# Patient Record
Sex: FEMALE | Race: WHITE | NOT HISPANIC OR LATINO | Employment: OTHER | URBAN - METROPOLITAN AREA
[De-identification: names, ages, dates, MRNs, and addresses within clinical notes are randomized per-mention and may not be internally consistent; named-entity substitution may affect disease eponyms.]

---

## 2023-05-23 ENCOUNTER — TELEPHONE (OUTPATIENT)
Dept: PRIMARY CARE | Facility: CLINIC | Age: 73
End: 2023-05-23
Payer: MEDICARE

## 2023-05-23 DIAGNOSIS — Z12.31 ENCOUNTER FOR SCREENING MAMMOGRAM FOR MALIGNANT NEOPLASM OF BREAST: Primary | ICD-10-CM

## 2023-06-15 ENCOUNTER — OFFICE VISIT (OUTPATIENT)
Dept: PRIMARY CARE | Facility: CLINIC | Age: 73
End: 2023-06-15
Payer: MEDICARE

## 2023-06-15 DIAGNOSIS — Z00.00 ROUTINE GENERAL MEDICAL EXAMINATION AT HEALTH CARE FACILITY: ICD-10-CM

## 2023-06-15 DIAGNOSIS — R53.83 MALAISE AND FATIGUE: ICD-10-CM

## 2023-06-15 DIAGNOSIS — R73.9 HYPERGLYCEMIA: ICD-10-CM

## 2023-06-15 DIAGNOSIS — E78.5 HYPERLIPIDEMIA, UNSPECIFIED HYPERLIPIDEMIA TYPE: ICD-10-CM

## 2023-06-15 DIAGNOSIS — M25.562 LEFT KNEE PAIN, UNSPECIFIED CHRONICITY: Primary | ICD-10-CM

## 2023-06-15 DIAGNOSIS — R53.81 MALAISE AND FATIGUE: ICD-10-CM

## 2023-06-15 DIAGNOSIS — R10.9 ABDOMINAL PAIN, UNSPECIFIED ABDOMINAL LOCATION: ICD-10-CM

## 2023-06-15 DIAGNOSIS — R07.9 CHEST PAIN, UNSPECIFIED TYPE: ICD-10-CM

## 2023-06-15 PROCEDURE — 1036F TOBACCO NON-USER: CPT | Performed by: INTERNAL MEDICINE

## 2023-06-15 PROCEDURE — 83036 HEMOGLOBIN GLYCOSYLATED A1C: CPT | Performed by: INTERNAL MEDICINE

## 2023-06-15 PROCEDURE — 1170F FXNL STATUS ASSESSED: CPT | Performed by: INTERNAL MEDICINE

## 2023-06-15 PROCEDURE — 80061 LIPID PANEL: CPT | Performed by: INTERNAL MEDICINE

## 2023-06-15 PROCEDURE — 84443 ASSAY THYROID STIM HORMONE: CPT | Performed by: INTERNAL MEDICINE

## 2023-06-15 PROCEDURE — 1159F MED LIST DOCD IN RCRD: CPT | Performed by: INTERNAL MEDICINE

## 2023-06-15 PROCEDURE — 80053 COMPREHEN METABOLIC PANEL: CPT | Performed by: INTERNAL MEDICINE

## 2023-06-15 PROCEDURE — 1160F RVW MEDS BY RX/DR IN RCRD: CPT | Performed by: INTERNAL MEDICINE

## 2023-06-15 PROCEDURE — 99213 OFFICE O/P EST LOW 20 MIN: CPT | Performed by: INTERNAL MEDICINE

## 2023-06-15 PROCEDURE — 85025 COMPLETE CBC W/AUTO DIFF WBC: CPT | Performed by: INTERNAL MEDICINE

## 2023-06-15 PROCEDURE — G0439 PPPS, SUBSEQ VISIT: HCPCS | Performed by: INTERNAL MEDICINE

## 2023-06-15 RX ORDER — IVERMECTIN 10 MG/G
CREAM TOPICAL
COMMUNITY

## 2023-06-15 RX ORDER — FLUTICASONE PROPIONATE 50 MCG
1 SPRAY, SUSPENSION (ML) NASAL DAILY
COMMUNITY
Start: 2022-11-25 | End: 2024-03-06 | Stop reason: WASHOUT

## 2023-06-15 RX ORDER — ACETAMINOPHEN 500 MG
1 TABLET ORAL DAILY
COMMUNITY

## 2023-06-15 RX ORDER — MULTIVIT WITH MINERALS/HERBS
1 TABLET ORAL DAILY
COMMUNITY

## 2023-06-15 RX ORDER — SCOLOPAMINE TRANSDERMAL SYSTEM 1 MG/1
1 PATCH, EXTENDED RELEASE TRANSDERMAL
COMMUNITY
Start: 2023-01-09 | End: 2024-03-06 | Stop reason: WASHOUT

## 2023-06-15 RX ORDER — ACETAMINOPHEN 500 MG
TABLET ORAL 2 TIMES DAILY
COMMUNITY

## 2023-06-15 RX ORDER — ROSUVASTATIN CALCIUM 20 MG/1
1 TABLET, COATED ORAL DAILY
COMMUNITY
Start: 2016-09-07 | End: 2023-11-06

## 2023-06-15 ASSESSMENT — ACTIVITIES OF DAILY LIVING (ADL)
USING TELEPHONE: INDEPENDENT
USING TRANSPORTATION: INDEPENDENT
TAKING MEDICATION: INDEPENDENT
DOING HOUSEWORK: INDEPENDENT
EATING: INDEPENDENT
ADEQUATE_TO_COMPLETE_ADL: YES
BATHING: INDEPENDENT
NEEDS ASSISTANCE WITH FOOD: INDEPENDENT
GROCERY SHOPPING: INDEPENDENT
HEARING - RIGHT EAR: HEARING AID
STIL DRIVING: YES
WALKS IN HOME: INDEPENDENT
FEEDING YOURSELF: INDEPENDENT
MANAGING FINANCES: INDEPENDENT
GROOMING: INDEPENDENT
PATIENT'S MEMORY ADEQUATE TO SAFELY COMPLETE DAILY ACTIVITIES?: YES
PREPARING MEALS: INDEPENDENT
DRESSING YOURSELF: INDEPENDENT
TOILETING: INDEPENDENT
HEARING - LEFT EAR: HEARING AID
JUDGMENT_ADEQUATE_SAFELY_COMPLETE_DAILY_ACTIVITIES: YES

## 2023-06-15 ASSESSMENT — ANXIETY QUESTIONNAIRES
7. FEELING AFRAID AS IF SOMETHING AWFUL MIGHT HAPPEN: NOT AT ALL
2. NOT BEING ABLE TO STOP OR CONTROL WORRYING: NOT AT ALL
6. BECOMING EASILY ANNOYED OR IRRITABLE: NOT AT ALL
3. WORRYING TOO MUCH ABOUT DIFFERENT THINGS: NOT AT ALL
GAD7 TOTAL SCORE: 0
1. FEELING NERVOUS, ANXIOUS, OR ON EDGE: NOT AT ALL
IF YOU CHECKED OFF ANY PROBLEMS ON THIS QUESTIONNAIRE, HOW DIFFICULT HAVE THESE PROBLEMS MADE IT FOR YOU TO DO YOUR WORK, TAKE CARE OF THINGS AT HOME, OR GET ALONG WITH OTHER PEOPLE: NOT DIFFICULT AT ALL
4. TROUBLE RELAXING: NOT AT ALL
5. BEING SO RESTLESS THAT IT IS HARD TO SIT STILL: NOT AT ALL

## 2023-06-15 ASSESSMENT — ENCOUNTER SYMPTOMS
OCCASIONAL FEELINGS OF UNSTEADINESS: 0
DEPRESSION: 0
LOSS OF SENSATION IN FEET: 0

## 2023-06-15 ASSESSMENT — PATIENT HEALTH QUESTIONNAIRE - PHQ9
SUM OF ALL RESPONSES TO PHQ9 QUESTIONS 1 AND 2: 0
SUM OF ALL RESPONSES TO PHQ9 QUESTIONS 1 AND 2: 0
1. LITTLE INTEREST OR PLEASURE IN DOING THINGS: NOT AT ALL
2. FEELING DOWN, DEPRESSED OR HOPELESS: NOT AT ALL
1. LITTLE INTEREST OR PLEASURE IN DOING THINGS: NOT AT ALL
2. FEELING DOWN, DEPRESSED OR HOPELESS: NOT AT ALL

## 2023-06-15 ASSESSMENT — COLUMBIA-SUICIDE SEVERITY RATING SCALE - C-SSRS
1. IN THE PAST MONTH, HAVE YOU WISHED YOU WERE DEAD OR WISHED YOU COULD GO TO SLEEP AND NOT WAKE UP?: NO
6. HAVE YOU EVER DONE ANYTHING, STARTED TO DO ANYTHING, OR PREPARED TO DO ANYTHING TO END YOUR LIFE?: NO
2. HAVE YOU ACTUALLY HAD ANY THOUGHTS OF KILLING YOURSELF?: NO
6. HAVE YOU EVER DONE ANYTHING, STARTED TO DO ANYTHING, OR PREPARED TO DO ANYTHING TO END YOUR LIFE?: NO
1. IN THE PAST MONTH, HAVE YOU WISHED YOU WERE DEAD OR WISHED YOU COULD GO TO SLEEP AND NOT WAKE UP?: NO
2. HAVE YOU ACTUALLY HAD ANY THOUGHTS OF KILLING YOURSELF?: NO

## 2023-06-15 ASSESSMENT — PAIN SCALES - GENERAL: PAINLEVEL: 2

## 2023-06-15 ASSESSMENT — GERIATRIC MINI NUTRITIONAL ASSESSMENT (MNA): C GENERAL MOBILITY: GOES OUT

## 2023-06-15 NOTE — PROGRESS NOTES
Subjective    Montse Manzo is a 73 y.o. female who presents for  Medicare Wellness Exam.  Patient is here for Blood work.    HPI    This is a 73 years old lady with medical history of hyperlipidemia., Degenerative joint disease, history of ovarian cyst removal, history of asthma, rosacea, S/p L shoulder Rotator surgery 12/12/2019, s/p Acute Bronchitis 8/2022.  Patient is  presented for Medicare Wellness Annual exam.  Interested to have Blood work done.    Patient is seen by GYN, has had mammography done  6/2/2023  Keeps a strict diet.  Cologuard 1/4/2021 is negative.  Followed by ophthalmology.     Has been having insomnia.     Review of Systems   Constitutional:  Negative for activity change, appetite change, fatigue and unexpected weight change.   Respiratory:  Negative for apnea and chest tightness.    Gastrointestinal:  Negative for abdominal distention.   Genitourinary:  Negative for dysuria.   Musculoskeletal:  Positive for arthralgias. Negative for back pain.   Neurological:  Negative for light-headedness and headaches.       Objective        Vitals:    06/15/23 1117   BP: 128/78   Pulse: 62   Resp: 16   Temp: 36.4 °C (97.5 °F)        Physical Exam  Constitutional:       Appearance: Normal appearance.   HENT:      Head: Normocephalic and atraumatic.   Eyes:      Pupils: Pupils are equal, round, and reactive to light.   Cardiovascular:      Rate and Rhythm: Normal rate and regular rhythm.   Pulmonary:      Breath sounds: Normal breath sounds.   Abdominal:      Palpations: Abdomen is soft.   Musculoskeletal:         General: Normal range of motion.      Cervical back: Normal range of motion.   Skin:     General: Skin is warm.   Neurological:      General: No focal deficit present.      Mental Status: She is alert.       Diagnoses and all orders for this visit:  Left knee pain, unspecified chronicity (Primary)  -     XR knee left 3 views; Future  -     XR knee left 3 views  Chest pain, unspecified type  -      XR chest 2 views; Future  -     XR chest 2 views  Abdominal pain, unspecified abdominal location  -     Cancel: US abdomen complete; Future  -     Cancel: US abdomen complete  Malaise and fatigue  -     CBC  -     Thyroid Stimulating Hormone  Hyperlipidemia, unspecified hyperlipidemia type  -     Comprehensive Metabolic Panel  -     Lipid Panel  Hyperglycemia  -     Hemoglobin A1C  Other orders  -     US abdomen complete       - H of L shoulder arthroscopic surgery 12/12/2019 for massive rotator cuff tear.  Stable.     - Health maintenance.  Medicare WEllness Annual exam is  today.  Influenza is up to date.  Cologuard is negative 1/2021.  Shingrix 6/2020 and 9/2020  Mammography is  6/2/2023  Pneumonia vaccination is up to date.  GYN exam is up to date.     - Rosacea.  Follow-up with dermatology Dr Szymanski.  Current medical therapy.     - Overweight with BMI. 29.05.  Diet, exercise.  Ideal BMI is less, then 25.     - Hyperlipidemia.  Continue to take Crestor.     - Insomnia.  Using over-the-counter supplements.  Consider melatonin at bedtime.  Sleep hygiene.     Bina Hinds MD

## 2023-06-16 VITALS
TEMPERATURE: 97.5 F | HEIGHT: 63 IN | SYSTOLIC BLOOD PRESSURE: 128 MMHG | BODY MASS INDEX: 29.06 KG/M2 | DIASTOLIC BLOOD PRESSURE: 78 MMHG | HEART RATE: 62 BPM | RESPIRATION RATE: 16 BRPM | WEIGHT: 164 LBS

## 2023-06-16 ASSESSMENT — ENCOUNTER SYMPTOMS
BACK PAIN: 0
ABDOMINAL DISTENTION: 0
CHEST TIGHTNESS: 0
ARTHRALGIAS: 1
APNEA: 0
HEADACHES: 0
FATIGUE: 0
APPETITE CHANGE: 0
ACTIVITY CHANGE: 0
LIGHT-HEADEDNESS: 0
UNEXPECTED WEIGHT CHANGE: 0
DYSURIA: 0

## 2023-06-17 NOTE — PROGRESS NOTES
"Subjective   Reason for Visit: Montse Manzo is an 73 y.o. female here for a Medicare Wellness visit.          Reviewed all medications by prescribing practitioner or clinical pharmacist (such as prescriptions, OTCs, herbal therapies and supplements) and documented in the medical record.    HPI    Patient Care Team:  Bina Hinds MD as PCP - General  Bina Hinds MD as PCP - [employer] PCP     Review of Systems    Objective   Vitals:  /78   Pulse 62   Temp 36.4 °C (97.5 °F) (Temporal)   Resp 16   Ht 1.6 m (5' 3\")   Wt 74.4 kg (164 lb)   BMI 29.05 kg/m²       Physical Exam    Assessment/Plan   Problem List Items Addressed This Visit    None  Visit Diagnoses     Left knee pain, unspecified chronicity    -  Primary    Relevant Orders    XR knee left 3 views (Completed)    Chest pain, unspecified type        Relevant Orders    XR chest 2 views (Completed)    Abdominal pain, unspecified abdominal location        Malaise and fatigue        Relevant Orders    CBC    Thyroid Stimulating Hormone    Hyperlipidemia, unspecified hyperlipidemia type        Relevant Orders    Comprehensive Metabolic Panel    Lipid Panel    Hyperglycemia        Relevant Orders    Hemoglobin A1C    Routine general medical examination at health care facility        Relevant Orders    1 Year Follow Up In Primary Care - Wellness Exam             "

## 2023-06-22 ENCOUNTER — APPOINTMENT (OUTPATIENT)
Dept: PRIMARY CARE | Facility: CLINIC | Age: 73
End: 2023-06-22
Payer: MEDICARE

## 2023-06-22 DIAGNOSIS — R39.15 URGENCY OF URINATION: Primary | ICD-10-CM

## 2023-10-28 ENCOUNTER — HOSPITAL ENCOUNTER (EMERGENCY)
Facility: HOSPITAL | Age: 73
Discharge: HOME | End: 2023-10-28
Attending: FAMILY MEDICINE
Payer: MEDICARE

## 2023-10-28 VITALS
SYSTOLIC BLOOD PRESSURE: 115 MMHG | HEART RATE: 63 BPM | OXYGEN SATURATION: 97 % | DIASTOLIC BLOOD PRESSURE: 61 MMHG | BODY MASS INDEX: 30.08 KG/M2 | WEIGHT: 169.75 LBS | HEIGHT: 63 IN | TEMPERATURE: 98.1 F | RESPIRATION RATE: 16 BRPM

## 2023-10-28 DIAGNOSIS — S90.112A CONTUSION OF LEFT GREAT TOE WITHOUT DAMAGE TO NAIL, INITIAL ENCOUNTER: Primary | ICD-10-CM

## 2023-10-28 DIAGNOSIS — M79.605 PAIN OF LEFT LOWER EXTREMITY: ICD-10-CM

## 2023-10-28 PROCEDURE — 99283 EMERGENCY DEPT VISIT LOW MDM: CPT | Mod: 25 | Performed by: FAMILY MEDICINE

## 2023-10-28 ASSESSMENT — PAIN - FUNCTIONAL ASSESSMENT: PAIN_FUNCTIONAL_ASSESSMENT: 0-10

## 2023-10-28 ASSESSMENT — PAIN SCALES - GENERAL
PAINLEVEL_OUTOF10: 5 - MODERATE PAIN
PAINLEVEL_OUTOF10: 0 - NO PAIN

## 2023-10-28 ASSESSMENT — COLUMBIA-SUICIDE SEVERITY RATING SCALE - C-SSRS
2. HAVE YOU ACTUALLY HAD ANY THOUGHTS OF KILLING YOURSELF?: NO
1. IN THE PAST MONTH, HAVE YOU WISHED YOU WERE DEAD OR WISHED YOU COULD GO TO SLEEP AND NOT WAKE UP?: NO
6. HAVE YOU EVER DONE ANYTHING, STARTED TO DO ANYTHING, OR PREPARED TO DO ANYTHING TO END YOUR LIFE?: NO

## 2023-10-28 ASSESSMENT — PAIN DESCRIPTION - LOCATION: LOCATION: FOOT

## 2023-10-28 ASSESSMENT — PAIN DESCRIPTION - ORIENTATION: ORIENTATION: LEFT

## 2023-10-28 NOTE — ED PROVIDER NOTES
HPI   Chief Complaint   Patient presents with    Foot Injury     Female patient states an eliptical was dropped in her left foot yesteday. She denies having any numbness or tingling. Pain is worse with ambulation.        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  She was moving a elliptical machine yesterday when her partner on the other and dropped it.  Her and landed on her left great toe.  She immediately applied pressure and ice.  She presents today concerned of possible fracture.  She has been ambulating, although when she puts her shoe on she can feel a lot of pressure over the great toe.                           No data recorded                Patient History   Past Medical History:   Diagnosis Date    Chronic rhinitis     Chronic rhinitis    Dyslipidemia     Other conditions influencing health status 03/09/2020    History of cough     Past Surgical History:   Procedure Laterality Date    OTHER SURGICAL HISTORY  12/23/2019    Rotator cuff repair    ROTATOR CUFF REPAIR       No family history on file.  Social History     Tobacco Use    Smoking status: Former     Types: Cigarettes     Passive exposure: Past    Smokeless tobacco: Never   Vaping Use    Vaping Use: Never used   Substance Use Topics    Alcohol use: Yes    Drug use: Never       Physical Exam   ED Triage Vitals [10/28/23 1435]   Temp Heart Rate Resp BP   36.7 °C (98.1 °F) 63 16 115/61      SpO2 Temp Source Heart Rate Source Patient Position   97 % Temporal Monitor Sitting      BP Location FiO2 (%)     Left arm --       Physical Exam  Musculoskeletal:      Comments: Left great toe: There is subungual hematoma under the nail, which is intact.  The first, second and third toes are ecchymotic, and tender to palpation across the distal phalanges.          ED Course & MDM   Diagnoses as of 10/29/23 8847   Contusion of left great toe without damage to nail, initial encounter   Pain of left lower extremity       Medical Decision Making  X-ray of the patient's  left foot was nonacute.  There was mention of ossific fragments about the fibular sesamoid bone at the 1st MTP  joint that were read as remote/nonacute.     The patient is advised to apply ice to the distal toes over the next 48 hr, followed by heat. She declines a walking boot or prosthetic.   She is also referred to podiatry for evaluation of the unusual, nonacute ossific fragments noted in the xray.           Procedure  Procedures     Leonel Carranza MD  10/28/23 1558       Leonel Carranza MD  10/29/23 5822

## 2023-11-06 DIAGNOSIS — E78.5 HYPERLIPIDEMIA, UNSPECIFIED HYPERLIPIDEMIA TYPE: Primary | ICD-10-CM

## 2023-11-06 RX ORDER — ROSUVASTATIN CALCIUM 20 MG/1
20 TABLET, COATED ORAL DAILY
Qty: 90 TABLET | Refills: 0 | Status: SHIPPED | OUTPATIENT
Start: 2023-11-06 | End: 2023-11-07 | Stop reason: SDUPTHER

## 2023-11-07 RX ORDER — ROSUVASTATIN CALCIUM 20 MG/1
20 TABLET, COATED ORAL DAILY
Qty: 90 TABLET | Refills: 2 | Status: SHIPPED | OUTPATIENT
Start: 2023-11-07 | End: 2023-11-20 | Stop reason: SDUPTHER

## 2024-03-06 ENCOUNTER — OFFICE VISIT (OUTPATIENT)
Dept: OBSTETRICS AND GYNECOLOGY | Facility: CLINIC | Age: 74
End: 2024-03-06
Payer: MEDICARE

## 2024-03-06 VITALS — HEIGHT: 63 IN | WEIGHT: 175 LBS | BODY MASS INDEX: 31.01 KG/M2

## 2024-03-06 DIAGNOSIS — Z12.31 BREAST CANCER SCREENING BY MAMMOGRAM: ICD-10-CM

## 2024-03-06 DIAGNOSIS — Z01.419 WELL WOMAN EXAM: Primary | ICD-10-CM

## 2024-03-06 DIAGNOSIS — Z12.4 CERVICAL CANCER SCREENING: ICD-10-CM

## 2024-03-06 PROCEDURE — 88175 CYTOPATH C/V AUTO FLUID REDO: CPT

## 2024-03-06 PROCEDURE — 99387 INIT PM E/M NEW PAT 65+ YRS: CPT | Performed by: OBSTETRICS & GYNECOLOGY

## 2024-03-06 PROCEDURE — 87624 HPV HI-RISK TYP POOLED RSLT: CPT

## 2024-03-06 PROCEDURE — 1126F AMNT PAIN NOTED NONE PRSNT: CPT | Performed by: OBSTETRICS & GYNECOLOGY

## 2024-03-06 PROCEDURE — 1157F ADVNC CARE PLAN IN RCRD: CPT | Performed by: OBSTETRICS & GYNECOLOGY

## 2024-03-06 PROCEDURE — 1036F TOBACCO NON-USER: CPT | Performed by: OBSTETRICS & GYNECOLOGY

## 2024-03-06 ASSESSMENT — ENCOUNTER SYMPTOMS
FATIGUE: 0
HEMATURIA: 0
BLOOD IN STOOL: 0
COLOR CHANGE: 0
ABDOMINAL PAIN: 0
FLANK PAIN: 0
CONSTIPATION: 0
FEVER: 0
VOMITING: 0
SLEEP DISTURBANCE: 0
SHORTNESS OF BREATH: 0
DIARRHEA: 0
APPETITE CHANGE: 0
ABDOMINAL DISTENTION: 0
DYSURIA: 0
NAUSEA: 0
CHILLS: 0
UNEXPECTED WEIGHT CHANGE: 0
FREQUENCY: 0
BACK PAIN: 0

## 2024-03-06 ASSESSMENT — PAIN SCALES - GENERAL: PAINLEVEL: 0-NO PAIN

## 2024-03-06 NOTE — PROGRESS NOTES
History Of Present Illness  Routine Gyn Exam  Montse CARRION Anais here for routine WWE.  Pt is menopausal.  LMP .  Did take some short courses of progesterone at the time but no other HRT.     Reviewed pertinent medical, surgical, social and family history with patient.  Concerns: none.   Exercise: walking.    Gynecologic History  Menopause: LMP 2009 ; cont to experience some mild hot flashes  HRT use: never  Pap history: denies h/o abnormal pap   Sexually active: yes with female partner   GYN concerns in past: h/o right ovarian cystectomy     Obstetric History  OB History    Para Term  AB Living   0 0 0 0 0 0   SAB IAB Ectopic Multiple Live Births   0 0 0 0 0        Past Medical History  She has a past medical history of Chronic rhinitis, Dyslipidemia, and Other conditions influencing health status (2020).    Surgical History  She has a past surgical history that includes Other surgical history (2019) and Rotator cuff repair.     Social History  She reports that she has quit smoking. Her smoking use included cigarettes. She has been exposed to tobacco smoke. She has never used smokeless tobacco. She reports current alcohol use. She reports that she does not use drugs.    Family History  No family history on file.     Allergies  Atorvastatin, Clarithromycin, and Bacitracin    Review of Systems   Constitutional:  Negative for appetite change, chills, fatigue, fever and unexpected weight change.   Respiratory:  Negative for shortness of breath.    Cardiovascular:  Negative for chest pain.   Gastrointestinal:  Negative for abdominal distention, abdominal pain, blood in stool, constipation, diarrhea, nausea and vomiting.   Endocrine: Negative for cold intolerance and heat intolerance.   Genitourinary:  Negative for dyspareunia, dysuria, flank pain, frequency, genital sores, hematuria, menstrual problem, pelvic pain, urgency, vaginal bleeding, vaginal discharge and vaginal pain.  "  Musculoskeletal:  Negative for back pain.   Skin:  Negative for color change.   Psychiatric/Behavioral:  Negative for sleep disturbance.        Ht 1.6 m (5' 3\")   Wt 79.4 kg (175 lb)   BMI 31.00 kg/m²      Physical Exam  Constitutional:       Appearance: Normal appearance.   HENT:      Head: Normocephalic and atraumatic.   Chest:   Breasts:     Right: Normal.      Left: Normal.   Abdominal:      General: Abdomen is flat.      Palpations: Abdomen is soft.      Tenderness: There is no abdominal tenderness.   Genitourinary:     General: Normal vulva.      Vagina: Normal.      Cervix: Normal.      Uterus: Normal.       Adnexa: Right adnexa normal and left adnexa normal.   Skin:     General: Skin is warm and dry.   Neurological:      Mental Status: She is alert and oriented to person, place, and time.   Psychiatric:         Mood and Affect: Mood normal.               Assessment/Plan         Routine Well Woman Exam Today  Discussed diet and exercise.   Reviewed routine health screenings.   Pap: pap done today   Recommend annual mammograms.   Currently up to date on colon cancer screening.  (Cologuard 2021)                   Jeniffer Marti MD  "

## 2024-03-19 LAB
CYTOLOGY CMNT CVX/VAG CYTO-IMP: NORMAL
HPV HR 12 DNA GENITAL QL NAA+PROBE: NEGATIVE
HPV HR GENOTYPES PNL CVX NAA+PROBE: NEGATIVE
HPV16 DNA SPEC QL NAA+PROBE: NEGATIVE
HPV18 DNA SPEC QL NAA+PROBE: NEGATIVE
LAB AP HPV GENOTYPE QUESTION: YES
LAB AP HPV HR: NORMAL
LABORATORY COMMENT REPORT: NORMAL
PATH REPORT.TOTAL CANCER: NORMAL

## 2024-04-03 ENCOUNTER — OFFICE VISIT (OUTPATIENT)
Dept: PRIMARY CARE | Facility: CLINIC | Age: 74
End: 2024-04-03
Payer: MEDICARE

## 2024-04-03 VITALS
SYSTOLIC BLOOD PRESSURE: 122 MMHG | RESPIRATION RATE: 16 BRPM | HEART RATE: 64 BPM | HEIGHT: 63 IN | BODY MASS INDEX: 31.36 KG/M2 | WEIGHT: 177 LBS | DIASTOLIC BLOOD PRESSURE: 62 MMHG | TEMPERATURE: 97.3 F

## 2024-04-03 DIAGNOSIS — Z12.11 COLON CANCER SCREENING: ICD-10-CM

## 2024-04-03 DIAGNOSIS — E78.5 HYPERLIPIDEMIA, UNSPECIFIED HYPERLIPIDEMIA TYPE: ICD-10-CM

## 2024-04-03 DIAGNOSIS — R53.81 MALAISE AND FATIGUE: ICD-10-CM

## 2024-04-03 DIAGNOSIS — Z23 NEED FOR VACCINATION: Primary | ICD-10-CM

## 2024-04-03 DIAGNOSIS — R53.83 MALAISE AND FATIGUE: ICD-10-CM

## 2024-04-03 DIAGNOSIS — R73.9 HYPERGLYCEMIA, UNSPECIFIED: ICD-10-CM

## 2024-04-03 DIAGNOSIS — R73.9 HYPERGLYCEMIA: ICD-10-CM

## 2024-04-03 PROCEDURE — 86706 HEP B SURFACE ANTIBODY: CPT

## 2024-04-03 PROCEDURE — 80061 LIPID PANEL: CPT | Performed by: INTERNAL MEDICINE

## 2024-04-03 PROCEDURE — 99213 OFFICE O/P EST LOW 20 MIN: CPT | Performed by: INTERNAL MEDICINE

## 2024-04-03 PROCEDURE — 1126F AMNT PAIN NOTED NONE PRSNT: CPT | Performed by: INTERNAL MEDICINE

## 2024-04-03 PROCEDURE — 36415 COLL VENOUS BLD VENIPUNCTURE: CPT

## 2024-04-03 PROCEDURE — 86708 HEPATITIS A ANTIBODY: CPT

## 2024-04-03 PROCEDURE — 83036 HEMOGLOBIN GLYCOSYLATED A1C: CPT | Performed by: INTERNAL MEDICINE

## 2024-04-03 PROCEDURE — 90677 PCV20 VACCINE IM: CPT | Performed by: INTERNAL MEDICINE

## 2024-04-03 PROCEDURE — 1160F RVW MEDS BY RX/DR IN RCRD: CPT | Performed by: INTERNAL MEDICINE

## 2024-04-03 PROCEDURE — 86803 HEPATITIS C AB TEST: CPT

## 2024-04-03 PROCEDURE — G0009 ADMIN PNEUMOCOCCAL VACCINE: HCPCS | Performed by: INTERNAL MEDICINE

## 2024-04-03 PROCEDURE — 84443 ASSAY THYROID STIM HORMONE: CPT | Performed by: INTERNAL MEDICINE

## 2024-04-03 PROCEDURE — 87340 HEPATITIS B SURFACE AG IA: CPT

## 2024-04-03 PROCEDURE — 1036F TOBACCO NON-USER: CPT | Performed by: INTERNAL MEDICINE

## 2024-04-03 PROCEDURE — 1157F ADVNC CARE PLAN IN RCRD: CPT | Performed by: INTERNAL MEDICINE

## 2024-04-03 PROCEDURE — 1159F MED LIST DOCD IN RCRD: CPT | Performed by: INTERNAL MEDICINE

## 2024-04-03 PROCEDURE — 85025 COMPLETE CBC W/AUTO DIFF WBC: CPT | Performed by: INTERNAL MEDICINE

## 2024-04-03 PROCEDURE — 80053 COMPREHEN METABOLIC PANEL: CPT | Performed by: INTERNAL MEDICINE

## 2024-04-03 ASSESSMENT — PATIENT HEALTH QUESTIONNAIRE - PHQ9
1. LITTLE INTEREST OR PLEASURE IN DOING THINGS: NOT AT ALL
SUM OF ALL RESPONSES TO PHQ9 QUESTIONS 1 AND 2: 0
2. FEELING DOWN, DEPRESSED OR HOPELESS: NOT AT ALL

## 2024-04-03 ASSESSMENT — ENCOUNTER SYMPTOMS
OCCASIONAL FEELINGS OF UNSTEADINESS: 0
DEPRESSION: 0
LOSS OF SENSATION IN FEET: 0

## 2024-04-03 ASSESSMENT — COLUMBIA-SUICIDE SEVERITY RATING SCALE - C-SSRS
2. HAVE YOU ACTUALLY HAD ANY THOUGHTS OF KILLING YOURSELF?: NO
6. HAVE YOU EVER DONE ANYTHING, STARTED TO DO ANYTHING, OR PREPARED TO DO ANYTHING TO END YOUR LIFE?: NO
1. IN THE PAST MONTH, HAVE YOU WISHED YOU WERE DEAD OR WISHED YOU COULD GO TO SLEEP AND NOT WAKE UP?: NO

## 2024-04-03 ASSESSMENT — PAIN SCALES - GENERAL: PAINLEVEL: 0-NO PAIN

## 2024-04-03 NOTE — PROGRESS NOTES
Subjective    Montse Manzo is a 73 y.o. female who presents for follow up.   Patient is planning her trip to [place], in need for vaccinations.    HPI    This is a 73 years old lady with medical history of hyperlipidemia., Degenerative joint disease, history of ovarian cyst removal, history of asthma, rosacea, S/p L shoulder Rotator surgery 12/12/2019, s/p Acute Bronchitis 8/2022.  Patient is  presented for  follow up.    Patient is seen by GYN, has had mammography done  6/2/2023  Keeps a strict diet.  Cologuard 1/4/2021 is negative, interested to repeat.  Followed by ophthalmology.     Has been having insomnia.   Review of Systems   All other systems reviewed and are negative.      Objective        Vitals:    04/03/24 1436   BP: 122/62   Pulse: 64   Resp: 16   Temp: 36.3 °C (97.3 °F)        Physical Exam  Constitutional:       Appearance: Normal appearance.   HENT:      Head: Normocephalic and atraumatic.      Right Ear: External ear normal.      Left Ear: External ear normal.      Nose: Nose normal.      Mouth/Throat:      Mouth: Mucous membranes are moist.   Eyes:      Extraocular Movements: Extraocular movements intact.      Conjunctiva/sclera: Conjunctivae normal.      Pupils: Pupils are equal, round, and reactive to light.   Cardiovascular:      Rate and Rhythm: Normal rate.      Pulses: Normal pulses.      Heart sounds: Normal heart sounds.   Pulmonary:      Effort: Pulmonary effort is normal.      Breath sounds: Normal breath sounds.   Abdominal:      General: Abdomen is flat. Bowel sounds are normal.      Palpations: Abdomen is soft.   Musculoskeletal:         General: Normal range of motion.      Cervical back: Normal range of motion and neck supple.   Skin:     General: Skin is warm and dry.   Neurological:      General: No focal deficit present.      Mental Status: She is alert.   Psychiatric:         Mood and Affect: Mood normal.         Behavior: Behavior normal.         Thought Content: Thought  content normal.         Judgment: Judgment normal.       Diagnoses and all orders for this visit:  Need for vaccination (Primary)  -     Hepatitis A Antibody, Total  -     Hepatitis B Surface Antibody  -     Hepatitis B Surface Antigen  -     Hepatitis C Antibody  -     Hemoglobin A1C  Colon cancer screening  -     Cologuard® colon cancer screening; Future  -     Cologuard® colon cancer screening  Hyperglycemia  Malaise and fatigue  -     CBC  -     Thyroid Stimulating Hormone  Hyperlipidemia, unspecified hyperlipidemia type  -     Comprehensive Metabolic Panel  -     Lipid Panel  Hyperglycemia, unspecified  -     Hemoglobin A1C  Other orders  -     Pneumococcal conjugate vaccine, 20-valent (PREVNAR 20)     Trip planning to [place].  In need for Typhoid vaccine.  In need for Poliomyelitis   booster.     H of L shoulder arthroscopic surgery 12/12/2019 for massive rotator cuff tear.  Stable.     - Health maintenance.  Medicare WEllness Annual exam is  up to date.  Influenza is up to date.  Cologuard is negative 1/2021, interested to repeat it.      Shingrix 6/2020 and 9/2020  Mammography is  6/2/2023  Pneumonia vaccination is up to date.  GYN exam is up to date.     - Rosacea.  Follow-up with dermatology Dr Szymanski.  Current medical therapy.     - Overweight with BMI. 31.35.  Diet, exercise.  Ideal BMI is less, then 25.     - Hyperlipidemia.  Continue to take Crestor.     - Insomnia.  Using over-the-counter supplements.  Consider melatonin at bedtime.  Sleep hygiene.     Bina Hinds MD

## 2024-04-04 LAB
HAV AB SER QL IA: REACTIVE
HBV SURFACE AB SER-ACNC: >1000 MIU/ML
HBV SURFACE AG SERPL QL IA: NONREACTIVE
HCV AB SER QL: NONREACTIVE

## 2024-04-19 DIAGNOSIS — E78.5 HYPERLIPIDEMIA, UNSPECIFIED HYPERLIPIDEMIA TYPE: ICD-10-CM

## 2024-04-19 LAB — NONINV COLON CA DNA+OCC BLD SCRN STL QL: NEGATIVE

## 2024-04-19 RX ORDER — ROSUVASTATIN CALCIUM 20 MG/1
20 TABLET, COATED ORAL DAILY
Qty: 90 TABLET | Refills: 3 | Status: SHIPPED | OUTPATIENT
Start: 2024-04-19

## 2024-06-18 ENCOUNTER — HOSPITAL ENCOUNTER (OUTPATIENT)
Dept: RADIOLOGY | Facility: CLINIC | Age: 74
Discharge: HOME | End: 2024-06-18
Payer: MEDICARE

## 2024-06-18 VITALS — BODY MASS INDEX: 30.65 KG/M2 | HEIGHT: 63 IN | WEIGHT: 173 LBS

## 2024-06-18 DIAGNOSIS — Z12.31 BREAST CANCER SCREENING BY MAMMOGRAM: ICD-10-CM

## 2024-06-18 PROCEDURE — 77063 BREAST TOMOSYNTHESIS BI: CPT | Performed by: STUDENT IN AN ORGANIZED HEALTH CARE EDUCATION/TRAINING PROGRAM

## 2024-06-18 PROCEDURE — 77067 SCR MAMMO BI INCL CAD: CPT

## 2024-06-18 PROCEDURE — 77067 SCR MAMMO BI INCL CAD: CPT | Performed by: STUDENT IN AN ORGANIZED HEALTH CARE EDUCATION/TRAINING PROGRAM

## 2024-07-15 DIAGNOSIS — Z29.89 NEED FOR MALARIA PROPHYLAXIS: Primary | ICD-10-CM

## 2024-07-15 RX ORDER — ATOVAQUONE AND PROGUANIL HYDROCHLORIDE 250; 100 MG/1; MG/1
1 TABLET, FILM COATED ORAL DAILY
Qty: 22 TABLET | Refills: 0 | Status: SHIPPED | OUTPATIENT
Start: 2024-07-15 | End: 2024-08-06

## 2024-07-25 DIAGNOSIS — R69 ILLNESS ASSOCIATED WITH TRAVEL: Primary | ICD-10-CM

## 2024-07-25 RX ORDER — CIPROFLOXACIN 500 MG/1
500 TABLET ORAL 2 TIMES DAILY
Qty: 6 TABLET | Refills: 0 | Status: SHIPPED | OUTPATIENT
Start: 2024-07-25 | End: 2024-07-28

## 2024-08-01 ENCOUNTER — TELEPHONE (OUTPATIENT)
Dept: PRIMARY CARE | Facility: CLINIC | Age: 74
End: 2024-08-01
Payer: MEDICARE

## 2024-08-01 NOTE — TELEPHONE ENCOUNTER
Patient had tested positive for COVID today.  Has some scratchy throat,  minimal  cough.  Taking Claritin.  Plenty of fluids.  You can take Tylenol as needed.  Stay isolated for 5 days at least.  Plenty of rest.

## 2024-08-01 NOTE — TELEPHONE ENCOUNTER
Pt has some scratchy throat and some headache, not much, no Fever, only some mild symptoms, what you would suggest? checked for Covid and is positive.   Does she need to stay home and rest? You know she has a big trip planned to [place] for 3 weeks, any other suggestions beside the rest?  Does she need to take any medications with her?

## 2024-08-04 ENCOUNTER — HOSPITAL ENCOUNTER (EMERGENCY)
Facility: HOSPITAL | Age: 74
Discharge: HOME | End: 2024-08-04
Payer: MEDICARE

## 2024-08-04 VITALS
HEIGHT: 63 IN | DIASTOLIC BLOOD PRESSURE: 70 MMHG | BODY MASS INDEX: 29.95 KG/M2 | WEIGHT: 169 LBS | OXYGEN SATURATION: 98 % | RESPIRATION RATE: 16 BRPM | TEMPERATURE: 98.7 F | SYSTOLIC BLOOD PRESSURE: 134 MMHG | HEART RATE: 67 BPM

## 2024-08-04 DIAGNOSIS — U07.1 COVID-19: ICD-10-CM

## 2024-08-04 DIAGNOSIS — J06.9 UPPER RESPIRATORY TRACT INFECTION, UNSPECIFIED TYPE: Primary | ICD-10-CM

## 2024-08-04 LAB
S PYO DNA THROAT QL NAA+PROBE: NOT DETECTED
SARS-COV-2 RNA RESP QL NAA+PROBE: DETECTED

## 2024-08-04 PROCEDURE — 87635 SARS-COV-2 COVID-19 AMP PRB: CPT | Performed by: NURSE PRACTITIONER

## 2024-08-04 PROCEDURE — 87651 STREP A DNA AMP PROBE: CPT | Performed by: NURSE PRACTITIONER

## 2024-08-04 PROCEDURE — 2500000001 HC RX 250 WO HCPCS SELF ADMINISTERED DRUGS (ALT 637 FOR MEDICARE OP): Performed by: NURSE PRACTITIONER

## 2024-08-04 PROCEDURE — 99283 EMERGENCY DEPT VISIT LOW MDM: CPT

## 2024-08-04 RX ORDER — AMOXICILLIN AND CLAVULANATE POTASSIUM 875; 125 MG/1; MG/1
1 TABLET, FILM COATED ORAL ONCE
Status: COMPLETED | OUTPATIENT
Start: 2024-08-04 | End: 2024-08-04

## 2024-08-04 RX ORDER — ALBUTEROL SULFATE 90 UG/1
2 INHALANT RESPIRATORY (INHALATION) EVERY 6 HOURS PRN
Qty: 18 G | Refills: 0 | Status: SHIPPED | OUTPATIENT
Start: 2024-08-04

## 2024-08-04 RX ORDER — ACETAMINOPHEN 500 MG
1000 TABLET ORAL 2 TIMES DAILY
Qty: 40 TABLET | Refills: 0 | Status: SHIPPED | OUTPATIENT
Start: 2024-08-04 | End: 2024-08-14

## 2024-08-04 RX ORDER — AMOXICILLIN AND CLAVULANATE POTASSIUM 875; 125 MG/1; MG/1
875 TABLET, FILM COATED ORAL EVERY 12 HOURS
Qty: 20 TABLET | Refills: 0 | Status: SHIPPED | OUTPATIENT
Start: 2024-08-04 | End: 2024-08-14

## 2024-08-04 ASSESSMENT — COLUMBIA-SUICIDE SEVERITY RATING SCALE - C-SSRS
1. IN THE PAST MONTH, HAVE YOU WISHED YOU WERE DEAD OR WISHED YOU COULD GO TO SLEEP AND NOT WAKE UP?: NO
6. HAVE YOU EVER DONE ANYTHING, STARTED TO DO ANYTHING, OR PREPARED TO DO ANYTHING TO END YOUR LIFE?: NO
2. HAVE YOU ACTUALLY HAD ANY THOUGHTS OF KILLING YOURSELF?: NO

## 2024-08-04 ASSESSMENT — PAIN - FUNCTIONAL ASSESSMENT: PAIN_FUNCTIONAL_ASSESSMENT: 0-10

## 2024-08-08 NOTE — ED PROVIDER NOTES
HPI   Chief Complaint   Patient presents with    Sore Throat       74-year-old female presents with concern for COVID complications.  All vital signs are normal and stable in triage.  She denies chest pain.  She denies dyspnea.  She endorses cough.  She denies abdominal pain, nausea, or vomiting.  There is no other cause for concern or complaint at this time.  She is vaccinated for COVID.      History provided by:  Patient   used: No            Patient History   Past Medical History:   Diagnosis Date    Chronic rhinitis     Chronic rhinitis    Dyslipidemia     Other conditions influencing health status 03/09/2020    History of cough     Past Surgical History:   Procedure Laterality Date    OTHER SURGICAL HISTORY  12/23/2019    Rotator cuff repair    ROTATOR CUFF REPAIR       Family History   Problem Relation Name Age of Onset    Breast cancer Maternal Grandmother Judith Rivera     Breast cancer Father's Sister  40     Social History     Tobacco Use    Smoking status: Former     Types: Cigarettes     Passive exposure: Past    Smokeless tobacco: Never   Vaping Use    Vaping status: Never Used   Substance Use Topics    Alcohol use: Yes    Drug use: Never       Physical Exam   ED Triage Vitals [08/04/24 0917]   Temperature Heart Rate Respirations BP   37.1 °C (98.7 °F) 67 16 134/70      Pulse Ox Temp Source Heart Rate Source Patient Position   98 % Temporal -- --      BP Location FiO2 (%)     -- --       Physical Exam  Constitutional:       Appearance: She is well-developed.   HENT:      Head: Normocephalic and atraumatic.      Right Ear: Tympanic membrane normal.      Left Ear: Tympanic membrane normal.      Mouth/Throat:      Mouth: Mucous membranes are moist.   Eyes:      Conjunctiva/sclera: Conjunctivae normal.   Cardiovascular:      Rate and Rhythm: Normal rate and regular rhythm.   Pulmonary:      Effort: Pulmonary effort is normal.      Breath sounds: Normal breath sounds.   Abdominal:       General: Bowel sounds are normal.      Palpations: Abdomen is soft.   Musculoskeletal:      Cervical back: Normal range of motion and neck supple.   Skin:     General: Skin is warm and dry.      Capillary Refill: Capillary refill takes less than 2 seconds.   Neurological:      General: No focal deficit present.      Mental Status: She is alert.   Psychiatric:         Mood and Affect: Mood normal.         Behavior: Behavior normal.           ED Course & MDM   Diagnoses as of 08/08/24 0915   Upper respiratory tract infection, unspecified type   COVID-19                 No data recorded     [place] Coma Scale Score: 15 (08/04/24 0918 : Natalie Corral RN)                           Medical Decision Making  Patient was positive for COVID.  She is already vaccinated so I did not recommend Paxlovid.  She was started on Augmentin for upper respiratory infection.  She will use acetaminophen for discomfort.  Return precautions reviewed and safely discharged home.        Procedure  Procedures     MIKA Bravo-DAMON  08/08/24 0914       MIKA Bravo-DAMON  08/08/24 0915

## 2024-09-16 ASSESSMENT — ENCOUNTER SYMPTOMS
CHILLS: 0
RHINORRHEA: 0
HEARTBURN: 0
COUGH: 1
SHORTNESS OF BREATH: 0
MYALGIAS: 0
WEIGHT LOSS: 0
HEMOPTYSIS: 0
FEVER: 0
SWEATS: 0
SORE THROAT: 0
HEADACHES: 0
WHEEZING: 1

## 2024-09-18 ENCOUNTER — OFFICE VISIT (OUTPATIENT)
Dept: PRIMARY CARE | Facility: CLINIC | Age: 74
End: 2024-09-18
Payer: MEDICARE

## 2024-09-18 VITALS
TEMPERATURE: 97.6 F | HEART RATE: 64 BPM | DIASTOLIC BLOOD PRESSURE: 64 MMHG | BODY MASS INDEX: 30.65 KG/M2 | WEIGHT: 173 LBS | RESPIRATION RATE: 16 BRPM | SYSTOLIC BLOOD PRESSURE: 122 MMHG

## 2024-09-18 DIAGNOSIS — J06.9 UPPER RESPIRATORY TRACT INFECTION, UNSPECIFIED TYPE: ICD-10-CM

## 2024-09-18 DIAGNOSIS — R05.9 COUGH, UNSPECIFIED TYPE: Primary | ICD-10-CM

## 2024-09-18 PROCEDURE — 99212 OFFICE O/P EST SF 10 MIN: CPT | Performed by: INTERNAL MEDICINE

## 2024-09-18 PROCEDURE — 1160F RVW MEDS BY RX/DR IN RCRD: CPT | Performed by: INTERNAL MEDICINE

## 2024-09-18 PROCEDURE — 1159F MED LIST DOCD IN RCRD: CPT | Performed by: INTERNAL MEDICINE

## 2024-09-18 PROCEDURE — 1126F AMNT PAIN NOTED NONE PRSNT: CPT | Performed by: INTERNAL MEDICINE

## 2024-09-18 PROCEDURE — 1157F ADVNC CARE PLAN IN RCRD: CPT | Performed by: INTERNAL MEDICINE

## 2024-09-18 PROCEDURE — 1036F TOBACCO NON-USER: CPT | Performed by: INTERNAL MEDICINE

## 2024-09-18 RX ORDER — PREDNISONE 20 MG/1
TABLET ORAL
Qty: 30 TABLET | Refills: 0 | Status: SHIPPED | OUTPATIENT
Start: 2024-09-18

## 2024-09-18 RX ORDER — ALBUTEROL SULFATE 90 UG/1
2 INHALANT RESPIRATORY (INHALATION) EVERY 6 HOURS PRN
Qty: 18 G | Refills: 0 | Status: SHIPPED | OUTPATIENT
Start: 2024-09-18

## 2024-09-18 RX ORDER — AMOXICILLIN AND CLAVULANATE POTASSIUM 875; 125 MG/1; MG/1
875 TABLET, FILM COATED ORAL 2 TIMES DAILY
Qty: 14 TABLET | Refills: 0 | Status: SHIPPED | OUTPATIENT
Start: 2024-09-18 | End: 2024-09-25

## 2024-09-18 ASSESSMENT — ENCOUNTER SYMPTOMS
RHINORRHEA: 0
WHEEZING: 1
CHILLS: 0
MYALGIAS: 0
HEARTBURN: 0
HEADACHES: 0
SORE THROAT: 0
SWEATS: 0
FEVER: 0
COUGH: 1
WEIGHT LOSS: 0
HEMOPTYSIS: 0
SHORTNESS OF BREATH: 0

## 2024-09-18 ASSESSMENT — PAIN SCALES - GENERAL: PAINLEVEL: 0-NO PAIN

## 2024-09-18 NOTE — PROGRESS NOTES
Subjective   Patient ID: Montse Manzo is a 74 y.o. female who presents with chief complaint of cough, wheezing.  Had COVID 8/1/2024, then she traveled to [place].  Upon arrival patient was exposed to sick nephew  Developed cough congestion wheezing.    Cough  This is a new problem. The current episode started 1 to 4 weeks ago. The problem has been gradually worsening. The problem occurs hourly. The cough is Productive of purulent sputum. Associated symptoms include nasal congestion, postnasal drip and wheezing. Pertinent negatives include no chest pain, chills, ear congestion, ear pain, fever, headaches, heartburn, hemoptysis, myalgias, rash, rhinorrhea, sore throat, shortness of breath, sweats or weight loss. The symptoms are aggravated by fumes.      This is a 73 years old lady with medical history of hyperlipidemia., Degenerative joint disease, history of ovarian cyst removal, history of asthma, rosacea, S/p L shoulder Rotator surgery 12/12/2019, s/p Acute Bronchitis 8/2022, s/p COVID 8/1/2024.  Patient is  presented for evaluation and treatment of the above complaints.  Has been sick for few weeks.  Has thick yellow, green phlegm formation.  Has wheezing.      Keeps a strict diet.  Cologuard 1/4/2021 is negative, interested to repeat.  Followed by ophthalmology.     Has been having insomnia.   Review of Systems   Constitutional:  Negative for chills, fever and weight loss.   HENT:  Positive for postnasal drip. Negative for ear pain, rhinorrhea and sore throat.    Respiratory:  Positive for cough and wheezing. Negative for hemoptysis and shortness of breath.    Cardiovascular:  Negative for chest pain.   Gastrointestinal:  Negative for heartburn.   Musculoskeletal:  Negative for myalgias.   Skin:  Negative for rash.   Neurological:  Negative for headaches.       Objective   /64   Pulse 64   Temp 36.4 °C (97.6 °F) (Temporal)   Resp 16   Wt 78.5 kg (173 lb)   BMI 30.65 kg/m²     Physical  Exam  Constitutional:       Appearance: Normal appearance.   HENT:      Head: Normocephalic and atraumatic.      Right Ear: External ear normal.      Left Ear: External ear normal.      Nose: Nose normal.      Mouth/Throat:      Mouth: Mucous membranes are moist.   Eyes:      Extraocular Movements: Extraocular movements intact.      Conjunctiva/sclera: Conjunctivae normal.      Pupils: Pupils are equal, round, and reactive to light.   Cardiovascular:      Rate and Rhythm: Normal rate.      Pulses: Normal pulses.      Heart sounds: Normal heart sounds.   Pulmonary:      Effort: Pulmonary effort is normal.      Breath sounds: Wheezing and rhonchi present.   Abdominal:      General: Abdomen is flat. Bowel sounds are normal.      Palpations: Abdomen is soft.   Musculoskeletal:         General: Normal range of motion.      Cervical back: Normal range of motion and neck supple.   Skin:     General: Skin is warm and dry.   Neurological:      General: No focal deficit present.      Mental Status: She is alert.   Psychiatric:         Mood and Affect: Mood normal.         Behavior: Behavior normal.         Thought Content: Thought content normal.         Judgment: Judgment normal.         Assessment/Plan   Problem List Items Addressed This Visit    None  Visit Diagnoses         Codes    Cough, unspecified type    -  Primary R05.9    Relevant Medications    amoxicillin-pot clavulanate (Augmentin) 875-125 mg tablet    predniSONE (Deltasone) 20 mg tablet    Upper respiratory tract infection, unspecified type     J06.9    Relevant Medications    albuterol 90 mcg/actuation inhaler          COPD exacerbation.  Cough.  Start on Augmentin, prednisone.  Use respiratory therapy.  Take Tylenol as needed.  Decongestants.      H of L shoulder arthroscopic surgery 12/12/2019 for massive rotator cuff tear.  Stable.     - Health maintenance.  Return for Medicare WEllness Annual exam.  Influenza is up to date.  Cologuard is negative 1/2021,  interested to repeat it.      Shingrix 6/2020 and 9/2020  Mammography is  6/2/2023  Pneumonia vaccination is up to date.  GYN exam is up to date.     - Rosacea.  Follow-up with dermatology Dr Szymanski.  Current medical therapy.     - Overweight with BMI. 30.65  Diet, exercise.  Ideal BMI is less, then 25.     - Hyperlipidemia.  Continue to take Crestor.     - Insomnia.  Using over-the-counter supplements.  Consider melatonin at bedtime.  Sleep hygiene.

## 2025-01-06 ENCOUNTER — TELEMEDICINE (OUTPATIENT)
Dept: PRIMARY CARE | Facility: CLINIC | Age: 75
End: 2025-01-06
Payer: MEDICARE

## 2025-01-06 DIAGNOSIS — R05.9 COUGH, UNSPECIFIED TYPE: Primary | ICD-10-CM

## 2025-01-06 PROCEDURE — 1160F RVW MEDS BY RX/DR IN RCRD: CPT | Performed by: INTERNAL MEDICINE

## 2025-01-06 PROCEDURE — G2211 COMPLEX E/M VISIT ADD ON: HCPCS | Performed by: INTERNAL MEDICINE

## 2025-01-06 PROCEDURE — 1159F MED LIST DOCD IN RCRD: CPT | Performed by: INTERNAL MEDICINE

## 2025-01-06 PROCEDURE — 99212 OFFICE O/P EST SF 10 MIN: CPT | Performed by: INTERNAL MEDICINE

## 2025-01-06 PROCEDURE — 1157F ADVNC CARE PLAN IN RCRD: CPT | Performed by: INTERNAL MEDICINE

## 2025-01-06 RX ORDER — AMOXICILLIN AND CLAVULANATE POTASSIUM 875; 125 MG/1; MG/1
875 TABLET, FILM COATED ORAL 2 TIMES DAILY
Qty: 14 TABLET | Refills: 0 | Status: SHIPPED | OUTPATIENT
Start: 2025-01-06 | End: 2025-01-13

## 2025-01-06 ASSESSMENT — ENCOUNTER SYMPTOMS
HOARSE VOICE: 1
TROUBLE SWALLOWING: 1
VOMITING: 0
SHORTNESS OF BREATH: 0
COUGH: 1
HEADACHES: 0
STRIDOR: 1
SWOLLEN GLANDS: 0
DIARRHEA: 0
NECK PAIN: 0
SORE THROAT: 1
ABDOMINAL PAIN: 0

## 2025-01-06 NOTE — PROGRESS NOTES
Subjective   Patient ID: Montse Manzo is a 74 y.o. female who is evaluated via telemedicine.  Virtual or Telephone Consent    An interactive audio and video telecommunication system which permits real time communications between the patient (at the originating site) and provider (at the distant site) was utilized to provide this telehealth service.   Verbal consent was requested and obtained from Montse Manzo on this date, 01/06/25 for a telehealth visit.      Plaint is cough.  Patient has been sick for 10 days.  Stated, that whole family is sick now.  Started to have sore throat, then runny nose, then developed congestion and cough with green sputum formation.  Has low-grade fever.      Sore Throat   This is a new problem. The current episode started 1 to 4 weeks ago. The problem has been gradually worsening. Neither side of throat is experiencing more pain than the other. There has been no fever. The pain is at a severity of 4/10. The pain is mild. Associated symptoms include congestion, coughing, a hoarse voice, stridor and trouble swallowing. Pertinent negatives include no abdominal pain, diarrhea, drooling, ear discharge, ear pain, headaches, plugged ear sensation, neck pain, shortness of breath, swollen glands or vomiting.        This is a 74 years old lady with medical history of hyperlipidemia., Degenerative joint disease, history of ovarian cyst removal, history of asthma, rosacea, S/p L shoulder Rotator surgery 12/12/2019, s/p Acute Bronchitis 8/2022, s/p COVID 8/1/2024.  Patient is  presented for evaluation and treatment of the above complaints.  Acute, that became sick 10 days ago.  Had COVID test done, was negative.  Has been having sore throat, runny nose initially, then developed cough.  Having green sputum formation.  No wheezing.  Keeps a strict diet.  Cologuard 1/4/2021 is negative, interested to repeat.  Followed by ophthalmology.        Review of Systems   HENT:  Positive for  congestion, hoarse voice, sore throat and trouble swallowing. Negative for drooling, ear discharge and ear pain.    Respiratory:  Positive for cough and stridor. Negative for shortness of breath.    Gastrointestinal:  Negative for abdominal pain, diarrhea and vomiting.   Musculoskeletal:  Negative for neck pain.   Neurological:  Negative for headaches.       Objective   There were no vitals taken for this visit.    Physical Exam    Assessment/Plan   Problem List Items Addressed This Visit    None  Visit Diagnoses         Codes    Cough, unspecified type    -  Primary R05.9    Relevant Medications    amoxicillin-pot clavulanate (Augmentin) 875-125 mg tablet          S/p viral illness.  Acute bronchitis.  Use albuterol.  Augmentin 875 mg twice a day.  Please, notify if no improvement.         H of L shoulder arthroscopic surgery 12/12/2019 for massive rotator cuff tear.  Stable.     - Health maintenance.  Return for Medicare WEllness Annual exam.  Influenza is up to date.  Cologuard is negative 1/2021, interested to repeat it.      Shingrix 6/2020 and 9/2020  Mammography  6/2024.  Pneumonia vaccination is up to date.  GYN exam is up to date.     - Rosacea.  Follow-up with dermatology Dr Szymanski.  Current medical therapy.     - Overweight with BMI. 30.65  Diet, exercise.  Ideal BMI is less, then 25.     - Hyperlipidemia.  Continue to take Crestor.     - Insomnia.  Using over-the-counter supplements.  Consider melatonin at bedtime.  Sleep hygiene.

## 2025-04-17 ENCOUNTER — APPOINTMENT (OUTPATIENT)
Dept: PRIMARY CARE | Facility: CLINIC | Age: 75
End: 2025-04-17
Payer: MEDICARE

## 2025-04-17 ENCOUNTER — HOSPITAL ENCOUNTER (OUTPATIENT)
Dept: RADIOLOGY | Facility: CLINIC | Age: 75
Discharge: HOME | End: 2025-04-17
Payer: MEDICARE

## 2025-04-17 VITALS
WEIGHT: 173 LBS | TEMPERATURE: 97.9 F | SYSTOLIC BLOOD PRESSURE: 112 MMHG | RESPIRATION RATE: 16 BRPM | DIASTOLIC BLOOD PRESSURE: 74 MMHG | BODY MASS INDEX: 30.65 KG/M2 | HEIGHT: 63 IN | HEART RATE: 64 BPM

## 2025-04-17 DIAGNOSIS — Z00.00 ROUTINE GENERAL MEDICAL EXAMINATION AT HEALTH CARE FACILITY: ICD-10-CM

## 2025-04-17 DIAGNOSIS — Z12.31 ENCOUNTER FOR SCREENING MAMMOGRAM FOR MALIGNANT NEOPLASM OF BREAST: Primary | ICD-10-CM

## 2025-04-17 DIAGNOSIS — M25.551 PAIN OF RIGHT HIP: ICD-10-CM

## 2025-04-17 DIAGNOSIS — E78.5 HYPERLIPIDEMIA, UNSPECIFIED HYPERLIPIDEMIA TYPE: ICD-10-CM

## 2025-04-17 DIAGNOSIS — R73.9 HYPERGLYCEMIA: ICD-10-CM

## 2025-04-17 DIAGNOSIS — R53.83 MALAISE AND FATIGUE: ICD-10-CM

## 2025-04-17 DIAGNOSIS — R73.9 HYPERGLYCEMIA, UNSPECIFIED: ICD-10-CM

## 2025-04-17 DIAGNOSIS — R53.81 MALAISE AND FATIGUE: ICD-10-CM

## 2025-04-17 LAB
ALBUMIN SERPL BCP-MCNC: 4.1 G/DL (ref 3.4–5)
ALP SERPL-CCNC: 108 U/L (ref 45–117)
ALT SERPL W P-5'-P-CCNC: 41 U/L (ref 16–63)
ANION GAP SERPL CALC-SCNC: 11 MMOL/L (ref 10–20)
AST SERPL W P-5'-P-CCNC: 19 U/L (ref 15–37)
BASOPHILS # BLD AUTO: 0.01 X10*3/UL (ref 0.1–1.6)
BASOPHILS NFR BLD AUTO: 0.23 % (ref 0–0.3)
BILIRUB SERPL-MCNC: 0.4 MG/DL (ref 0.2–1)
BUN SERPL-MCNC: 21 MG/DL (ref 7–18)
CALCIUM SERPL-MCNC: 9 MG/DL (ref 8.5–10.1)
CHLORIDE SERPL-SCNC: 104 MMOL/L (ref 98–107)
CHOLEST SERPL-MCNC: 190 MG/DL (ref 0–199)
CHOLESTEROL/HDL RATIO: 2.5 (ref 4.2–7)
CO2 SERPL-SCNC: 31 MMOL/L (ref 21–32)
CREAT SERPL-MCNC: 0.62 MG/DL (ref 0.6–1.1)
EGFRCR SERPLBLD CKD-EPI 2021: >90 ML/MIN/1.73M*2
EOSINOPHIL # BLD AUTO: 0.05 X10*3/UL (ref 0.04–0.5)
EOSINOPHIL NFR BLD AUTO: 1.05 % (ref 0.7–7)
ERYTHROCYTE [DISTWIDTH] IN BLOOD BY AUTOMATED COUNT: 14.9 % (ref 11.5–14.5)
GLUCOSE SERPL-MCNC: 90 MG/DL (ref 74–100)
HBA1C MFR BLD: 5.7 %
HCT VFR BLD AUTO: 41.2 % (ref 36.6–46.6)
HDLC SERPL-MCNC: 76 MG/DL (ref 40–59)
HGB BLD-MCNC: 14.22 G/DL (ref 12–15.4)
IS PATIENT FASTING: YES
LDLC SERPL DIRECT ASSAY-MCNC: 95 MG/DL (ref 0–100)
LYMPHOCYTES # BLD AUTO: 1.32 X10*3/UL (ref 0–6)
LYMPHOCYTES NFR BLD AUTO: 30.71 % (ref 20.5–51.1)
MCH RBC QN AUTO: 29.5 PG (ref 26–32)
MCHC RBC AUTO-ENTMCNC: 34.5 G/DL (ref 31–38)
MCV RBC AUTO: 85.4 FL (ref 80–96)
MONOCYTES # BLD AUTO: 0.27 X10*3/UL (ref 1.6–24.9)
MONOCYTES NFR BLD AUTO: 6.27 % (ref 1.7–9.3)
NEUTROPHILS # BLD AUTO: 2.66 X10*3/UL (ref 1.4–6.5)
NEUTROPHILS NFR BLD AUTO: 61.74 % (ref 42.2–75.2)
PLATELET # BLD AUTO: 191.4 X10*3/UL (ref 150–450)
PMV BLD AUTO: 8.59 FL (ref 7.8–11)
POTASSIUM SERPL-SCNC: 4.7 MMOL/L (ref 3.5–5.1)
PROT SERPL-MCNC: 7 G/DL (ref 6.4–8.2)
RBC # BLD AUTO: 4.82 X10*6/UL (ref 3.9–5.3)
SODIUM SERPL-SCNC: 141 MMOL/L (ref 136–145)
TRIGL SERPL-MCNC: 43 MG/DL
TSH SERPL-ACNC: 2.52 MIU/L (ref 0.44–3.98)
WBC # BLD AUTO: 4.31 X10*3/UL (ref 4.5–10.5)

## 2025-04-17 PROCEDURE — 73502 X-RAY EXAM HIP UNI 2-3 VIEWS: CPT | Mod: RT

## 2025-04-17 PROCEDURE — 73502 X-RAY EXAM HIP UNI 2-3 VIEWS: CPT | Mod: RIGHT SIDE | Performed by: RADIOLOGY

## 2025-04-17 ASSESSMENT — ACTIVITIES OF DAILY LIVING (ADL)
DOING HOUSEWORK: INDEPENDENT
FEEDING YOURSELF: INDEPENDENT
NEEDS ASSISTANCE WITH FOOD: INDEPENDENT
HEARING - RIGHT EAR: HEARING AID
PATIENT'S MEMORY ADEQUATE TO SAFELY COMPLETE DAILY ACTIVITIES?: YES
WALKS IN HOME: INDEPENDENT
MANAGING FINANCES: INDEPENDENT
USING TELEPHONE: INDEPENDENT
BATHING: INDEPENDENT
GROOMING: INDEPENDENT
JUDGMENT_ADEQUATE_SAFELY_COMPLETE_DAILY_ACTIVITIES: YES
PREPARING MEALS: INDEPENDENT
PILL BOX USED: YES
TOILETING: INDEPENDENT
STIL DRIVING: YES
EATING: INDEPENDENT
HEARING - LEFT EAR: HEARING AID
GROCERY SHOPPING: INDEPENDENT
USING TRANSPORTATION: INDEPENDENT
DRESSING YOURSELF: INDEPENDENT
TAKING MEDICATION: INDEPENDENT
ADEQUATE_TO_COMPLETE_ADL: YES

## 2025-04-17 ASSESSMENT — ANXIETY QUESTIONNAIRES
5. BEING SO RESTLESS THAT IT IS HARD TO SIT STILL: NOT AT ALL
IF YOU CHECKED OFF ANY PROBLEMS ON THIS QUESTIONNAIRE, HOW DIFFICULT HAVE THESE PROBLEMS MADE IT FOR YOU TO DO YOUR WORK, TAKE CARE OF THINGS AT HOME, OR GET ALONG WITH OTHER PEOPLE: NOT DIFFICULT AT ALL
2. NOT BEING ABLE TO STOP OR CONTROL WORRYING: NOT AT ALL
1. FEELING NERVOUS, ANXIOUS, OR ON EDGE: NOT AT ALL
7. FEELING AFRAID AS IF SOMETHING AWFUL MIGHT HAPPEN: NOT AT ALL
6. BECOMING EASILY ANNOYED OR IRRITABLE: NOT AT ALL
GAD7 TOTAL SCORE: 0
3. WORRYING TOO MUCH ABOUT DIFFERENT THINGS: NOT AT ALL
4. TROUBLE RELAXING: NOT AT ALL

## 2025-04-17 ASSESSMENT — GERIATRIC MINI NUTRITIONAL ASSESSMENT (MNA)
C GENERAL MOBILITY: GOES OUT
B WEIGHT LOSS DURING THE LAST 3 MONTHS: NO WEIGHT LOSS
A HAS FOOD INTAKE DECLINED OVER THE PAST 3 MONTHS DUE TO LOSS OF APPETITE, DIGESTIVE PROBLEMS, CHEWING OR SWALLOWING DIFFICULTIES?: NO DECREASE IN FOOD INTAKE

## 2025-04-17 ASSESSMENT — ENCOUNTER SYMPTOMS
ACTIVITY CHANGE: 1
FATIGUE: 1
ARTHRALGIAS: 1
BACK PAIN: 0
OCCASIONAL FEELINGS OF UNSTEADINESS: 0
DEPRESSION: 0
LOSS OF SENSATION IN FEET: 0

## 2025-04-17 NOTE — PROGRESS NOTES
"Subjective   Patient ID: Montse Manoz is a 74 y.o. female who presents for Medicare wellness annual exam.  Having hip pain.  Interested to see an orthopedic surgery.  In need for x-ray.  Patient is here for blood work.  Having ongoing issues with cough.    HPI     This is a 74 years old lady with medical history of hyperlipidemia., Degenerative joint disease, history of ovarian cyst removal, history of asthma, rosacea, S/p L shoulder Rotator surgery 12/12/2019, s/p Acute Bronchitis 8/2022, s/p COVID 8/1/2024.  Patient is  presented for Medicare wellness annual exam.  She is here for blood work.  Has been having right hip pain, difficulty to ambulate, difficulty to change her position.  Having cough, no wheezing or shortness of breath.  Cologuard 1/4/2021 is negative, interested to repeat.  Followed by ophthalmology.     Review of Systems   Constitutional:  Positive for activity change and fatigue.   Musculoskeletal:  Positive for arthralgias. Negative for back pain.       Objective   /74   Pulse 64   Temp 36.6 °C (97.9 °F) (Temporal)   Resp 16   Ht 1.6 m (5' 3\")   Wt 78.5 kg (173 lb)   BMI 30.65 kg/m²     Physical Exam  Constitutional:       Appearance: Normal appearance.   HENT:      Head: Normocephalic.      Nose: Nose normal.   Cardiovascular:      Rate and Rhythm: Normal rate and regular rhythm.      Pulses: Normal pulses.      Heart sounds: Normal heart sounds.   Pulmonary:      Breath sounds: Normal breath sounds.   Musculoskeletal:         General: Tenderness present.      Cervical back: Normal range of motion and neck supple.   Skin:     General: Skin is warm.   Neurological:      General: No focal deficit present.      Mental Status: She is alert.         Assessment/Plan   Problem List Items Addressed This Visit    None  Visit Diagnoses         Codes      Encounter for screening mammogram for malignant neoplasm of breast    -  Primary Z12.31    Relevant Orders    BI mammo bilateral " screening tomosynthesis      Hyperglycemia, unspecified     R73.9      Hyperlipidemia, unspecified hyperlipidemia type     E78.5    Relevant Orders    Comprehensive Metabolic Panel (Completed)    Lipid Panel (Completed)      Malaise and fatigue     R53.81, R53.83    Relevant Orders    CBC w/5 Part Differential, [facility] (Completed)    Thyroid Stimulating Hormone (Completed)      Hyperglycemia     R73.9    Relevant Orders    Hemoglobin A1C (Completed)      Pain of right hip     M25.551    Relevant Orders    XR hip right with pelvis when performed 2 or 3 views (Completed)    Referral to Orthopedics and Sports Medicine      Routine general medical examination at health care facility     Z00.00    Relevant Orders    1 Year Follow Up In Primary Care - Wellness Exam          Status post acute bronchitis.  Cough.  Consider chest x-ray.  Use breast tree, use albuterol as needed.    Right hip pain.  X-ray.  Take Tylenol.  See orthopedic surgery.         H of L shoulder arthroscopic surgery 12/12/2019 for massive rotator cuff tear.  Stable.     - Health maintenance.  Medicare WEllness Annual exam is today.  Influenza is up to date.  Cologuard is negative 1/2021, interested to repeat it.      Shingrix 6/2020 and 9/2020  Mammography  6/2024, referral is placed.  Pneumonia vaccination is up to date.  GYN exam is up to date.     - Rosacea.  Follow-up with dermatology Dr Szymanski.  Current medical therapy.     - Overweight with BMI. 30.65  Diet, exercise.  Ideal BMI is less, then 25.     - Hyperlipidemia.  Continue to take Crestor.  Exercise Mediterranean diet.     - Insomnia.  Using over-the-counter supplements.  Consider melatonin at bedtime.  Sleep hygiene.[facility]

## 2025-05-09 ENCOUNTER — APPOINTMENT (OUTPATIENT)
Dept: ORTHOPEDIC SURGERY | Facility: CLINIC | Age: 75
End: 2025-05-09
Payer: MEDICARE

## 2025-05-09 DIAGNOSIS — M25.559 GREATER TROCHANTERIC PAIN SYNDROME: Primary | ICD-10-CM

## 2025-05-09 DIAGNOSIS — M25.551 PAIN OF RIGHT HIP: ICD-10-CM

## 2025-05-09 PROCEDURE — 99203 OFFICE O/P NEW LOW 30 MIN: CPT | Performed by: STUDENT IN AN ORGANIZED HEALTH CARE EDUCATION/TRAINING PROGRAM

## 2025-05-09 NOTE — PROGRESS NOTES
Montse Manzo  is a 74 y.o. year-old  female. she is a new patient to our office and presents with a chief complaint of Right  hip pain; she notes years of intermittent right hip pain, worse with increased activity.  She currently works at the [employer] walking 2 to 5 miles approximately 5 days/week.  Approximately 8 months ago she purchased a new pair of shoes, which she says coincided with her increase in pain.  She feels as if her foot supinates when she walks.  Denies any groin pain or new trauma to the right lower extremity.     Past Medical, Family, and Social History reviewed and inlcude:[none] all other history pertinent to the presenting problem  Review of Systems  A complete review of systems was conducted, pertinent only to the HPI noted above.  Constitutional: None  Eyes: No additions to above history  Ears, Nose, Throat: No additions to above history  Cardiovascular: No additions to above history  Respiratory: No additions to above history  GI: No additions to above history  : No additions to above history  Skin/Neuro: No additions to above history  Endocrine/Heme/Lymph: No additions to above history  Immunologic: No additions to above history  Psychiatric: No additions to above history  Musculoskeletal: see above  Eyes: sclera anicteric  ENT: hearing appropriate for normal conversation, neck appears symmetric with no gross thyromegaly  Pulm: No labored breathing, no wheezing  CVS: Regular rate and rhythm  Abd: Non-distended  Skin: No rashes, erythema, or induration around hip    MUSCULOSKELETAL EXAM: HIP      Right  HIP:   IR@90: [20] degrees   ER@90: [40] degrees   No Pain in groin with FADIR , TTP over GT this reproduces her symptoms    Neurovascularly Intact to Femroal/obturator/LFCN/S/S/SPN/DPN/T nerves on bilateral extremities    Xrays independently reviewed and interpreted: X-rays of the right hip demonstrate moderate osteoarthritis. Evidence of calcific tendinitis around GT.     The  patient history, physical examination and imaging studies are consistent with the diagnosis of hip osteoarthritis and trochanteric bursitis.    Options were discussed with the patient today, including continued conservative management.    After a thorough discussion with the patient including expectations, I would recommend we continue a conservative program for now.  We discussed activity modification, over the counter medications including NSAIDs, and PT. We will provide her with a prescription for PT and home exercises. She will also trial a pair of new shoes. We also discussed the possibility of a CSI in the future should her symptoms persist. All questions were answered, and she is in agreement with the treatment plan.

## 2025-05-27 ENCOUNTER — APPOINTMENT (OUTPATIENT)
Dept: PRIMARY CARE | Facility: CLINIC | Age: 75
End: 2025-05-27
Payer: MEDICARE

## 2025-05-27 ENCOUNTER — HOSPITAL ENCOUNTER (OUTPATIENT)
Dept: RADIOLOGY | Facility: CLINIC | Age: 75
Discharge: HOME | End: 2025-05-27
Payer: MEDICARE

## 2025-05-27 VITALS
TEMPERATURE: 97.2 F | SYSTOLIC BLOOD PRESSURE: 118 MMHG | BODY MASS INDEX: 30.47 KG/M2 | RESPIRATION RATE: 16 BRPM | WEIGHT: 172 LBS | HEART RATE: 64 BPM | DIASTOLIC BLOOD PRESSURE: 62 MMHG

## 2025-05-27 DIAGNOSIS — M25.562 LEFT KNEE PAIN, UNSPECIFIED CHRONICITY: Primary | ICD-10-CM

## 2025-05-27 DIAGNOSIS — M79.644 PAIN OF RIGHT THUMB: ICD-10-CM

## 2025-05-27 DIAGNOSIS — M25.562 LEFT KNEE PAIN, UNSPECIFIED CHRONICITY: ICD-10-CM

## 2025-05-27 DIAGNOSIS — R05.9 COUGH, UNSPECIFIED TYPE: ICD-10-CM

## 2025-05-27 PROCEDURE — 1036F TOBACCO NON-USER: CPT | Performed by: INTERNAL MEDICINE

## 2025-05-27 PROCEDURE — 1126F AMNT PAIN NOTED NONE PRSNT: CPT | Performed by: INTERNAL MEDICINE

## 2025-05-27 PROCEDURE — 73562 X-RAY EXAM OF KNEE 3: CPT | Mod: LT

## 2025-05-27 PROCEDURE — 1123F ACP DISCUSS/DSCN MKR DOCD: CPT | Performed by: INTERNAL MEDICINE

## 2025-05-27 PROCEDURE — 1160F RVW MEDS BY RX/DR IN RCRD: CPT | Performed by: INTERNAL MEDICINE

## 2025-05-27 PROCEDURE — 1158F ADVNC CARE PLAN TLK DOCD: CPT | Performed by: INTERNAL MEDICINE

## 2025-05-27 PROCEDURE — 1159F MED LIST DOCD IN RCRD: CPT | Performed by: INTERNAL MEDICINE

## 2025-05-27 PROCEDURE — 73562 X-RAY EXAM OF KNEE 3: CPT | Mod: LEFT SIDE | Performed by: RADIOLOGY

## 2025-05-27 PROCEDURE — G2211 COMPLEX E/M VISIT ADD ON: HCPCS | Performed by: INTERNAL MEDICINE

## 2025-05-27 PROCEDURE — 99213 OFFICE O/P EST LOW 20 MIN: CPT | Performed by: INTERNAL MEDICINE

## 2025-05-27 RX ORDER — AMOXICILLIN AND CLAVULANATE POTASSIUM 875; 125 MG/1; MG/1
875 TABLET, FILM COATED ORAL 2 TIMES DAILY
Qty: 10 TABLET | Refills: 0 | Status: SHIPPED | OUTPATIENT
Start: 2025-05-27 | End: 2025-06-01

## 2025-05-27 ASSESSMENT — ENCOUNTER SYMPTOMS
ARTHRALGIAS: 1
FATIGUE: 1
ACTIVITY CHANGE: 1

## 2025-05-27 ASSESSMENT — PAIN SCALES - GENERAL: PAINLEVEL_OUTOF10: 0-NO PAIN

## 2025-05-27 NOTE — PROGRESS NOTES
Subjective   Patient ID: Montse Manzo is a 74 y.o. female who presents with chief complaint of persistent cough with green sputum formation.  Having left knee pain.  Complaining of right thumb pain, has Dupuytren contracture.  Interested to see the specialist.    HPI      This is a 74 years old lady with medical history of hyperlipidemia., Degenerative joint disease, history of ovarian cyst removal, history of asthma, rosacea, S/p L shoulder Rotator surgery 12/12/2019, s/p Acute Bronchitis 8/2022, s/p COVID 8/1/2024.  Patient is  presented for evaluation and treatment of the above complaints.  Has been having left knee pain, decrease in range of motion.  Has right thumb pain, trigger points.  Concerned about Dupuytren contracture.  Has been sick for few weeks, having green sputum formation.       Review of Systems   Constitutional:  Positive for activity change and fatigue.   Musculoskeletal:  Positive for arthralgias.       Objective   /62   Pulse 64   Temp 36.2 °C (97.2 °F) (Temporal)   Resp 16   Wt 78 kg (172 lb)   BMI 30.47 kg/m²     Physical Exam  Constitutional:       Appearance: Normal appearance.   HENT:      Head: Normocephalic and atraumatic.      Right Ear: External ear normal.      Left Ear: External ear normal.      Nose: Nose normal.      Mouth/Throat:      Mouth: Mucous membranes are moist.   Eyes:      Extraocular Movements: Extraocular movements intact.      Conjunctiva/sclera: Conjunctivae normal.      Pupils: Pupils are equal, round, and reactive to light.   Cardiovascular:      Rate and Rhythm: Normal rate.      Pulses: Normal pulses.      Heart sounds: Normal heart sounds.   Pulmonary:      Effort: Pulmonary effort is normal.      Breath sounds: Rhonchi present.   Abdominal:      General: Abdomen is flat. Bowel sounds are normal.      Palpations: Abdomen is soft.   Musculoskeletal:         General: Normal range of motion.      Cervical back: Normal range of motion and neck  supple.   Skin:     General: Skin is warm and dry.   Neurological:      General: No focal deficit present.      Mental Status: She is alert.   Psychiatric:         Mood and Affect: Mood normal.         Behavior: Behavior normal.         Thought Content: Thought content normal.         Judgment: Judgment normal.         Assessment/Plan   Problem List Items Addressed This Visit    None  Visit Diagnoses         Codes      Left knee pain, unspecified chronicity    -  Primary M25.562      Pain of right thumb     M79.644    Relevant Orders    Referral to Orthopedics and Sports Medicine      Cough, unspecified type     R05.9    Relevant Medications    amoxicillin-clavulanate (Augmentin) 875-125 mg tablet          Asthma exacerbation.  Persistent cough.  Use respiratory therapy as directed.  Start on Augmentin 875 mg twice a day.     R hip  pain.  Bursitis.  Take Tylenol as needed.    Left knee pain.  Mostly, you have osteoarthritis.  X-ray.  See orthopedic surgery.  Take Tylenol as needed.    Right thumb pain.  Trigger points.  Dupuytren contracture.  See orthopedic surgery.         H of L shoulder arthroscopic surgery 12/12/2019 for massive rotator cuff tear.  Stable.     - Health maintenance.  Medicare WEllness Annual exam is  up to date.  Influenza is up to date.  Cologuard is negative 1/2021, interested to repeat it.      Shingrix 6/2020 and 9/2020  Mammography  6/2024, referral is placed.  Pneumonia vaccination is up to date.  GYN exam is up to date.     - Rosacea.  Follow-up with dermatology Dr Szymanski.  Current medical therapy.     - Overweight with BMI. 30. 47  Diet, exercise.  Ideal BMI is less, then 25.     - Hyperlipidemia.  Continue to take Crestor.  Exercise Mediterranean diet.     - Insomnia.  Using over-the-counter supplements.  Consider melatonin at bedtime.  Sleep hygiene.[facility]

## 2025-06-15 DIAGNOSIS — M79.641 HAND PAIN, RIGHT: Primary | ICD-10-CM

## 2025-06-15 NOTE — PROGRESS NOTES
[facility]  Hand and Upper Extremity Service  Initial evaluation / Consultation         Consult requested by Referring Physician: Dr. Hinds     Chief Complaint: Right thumb          75 y.o right hand dominant female presenting for symptomatic triggering of the right thumb hat she noticed first after pulling weeds in her yard. She has had no prior treatment for it and no history of diabetes. Her sister had a similar problem that responded to a steroid injection.          Please refer to New Patient Intake Form scanned into patient's electronic record for self reported past medical history, past surgical history, medications, allergies, family history, social history and 10 point review of systems    Examination:  Constitutional: Oriented to person, place, and time.  Appears well-developed and well-nourished.  Head: Normocephalic and atraumatic.  Eyes: Pupils are equal, round, and reactive to light.  Cardiovascular: Intact distal pulses.  Pulmonary/Chest/Breast: Effort normal. No respiratory distress.  Neurological: Alert and oriented to person, place, and time.  Skin: Skin is warm and dry.  Psychiatric: normal mood and affect.  Behavior is normal.  Musculoskeletal: Right hand reveals tenderness to palpation right thumb A1 pulley. Clear evidence of triggering with active IP joint flexion. No tenderness to palpation over thumb CMC joint.        Personal Interpretation of Diagnostic studies: X-rays of right hand obtained today demonstrate some mild arthritic changes at the base of the right thumb.        Impression:  Right thumb trigger finger       Plan: We discussed treatment options and she wants to try injection. So we gave her a right thumb trigger finger injection today and will follow up as needed for recurrence of symptoms.       In Office Procedures Performed: Right thumb trigger injection   Hand / UE Inj/Asp: R thumb A1 for trigger finger on 6/17/2025 8:51 AM  Indications:  tendon swelling and pain  Details: 25 G needle, volar approach  Medications: 0.5 mL lidocaine 10 mg/mL (1 %); 20 mg triamcinolone acetonide 40 mg/mL  Outcome: tolerated well, no immediate complications  Procedure, treatment alternatives, risks and benefits explained, specific risks discussed. Consent was given by the patient. Immediately prior to procedure a time out was called to verify the correct patient, procedure, equipment, support staff and site/side marked as required. Patient was prepped and draped in the usual sterile fashion.       Follow up: As needed              Berny Martinez MD  [facility]  Department of Orthopaedic Surgery  Hand and Upper Extremity Reconstruction      Scribe Attestation  By signing my name below, I, Aime Martin , Scrdeana   attest that this documentation has been prepared under the direction and in the presence of Dr. Berny Martinez.      Dictation performed with the use of voice recognition software.  Syntax and grammatical errors may exist.

## 2025-06-17 ENCOUNTER — HOSPITAL ENCOUNTER (OUTPATIENT)
Dept: RADIOLOGY | Facility: HOSPITAL | Age: 75
Discharge: HOME | End: 2025-06-17
Payer: MEDICARE

## 2025-06-17 ENCOUNTER — OFFICE VISIT (OUTPATIENT)
Dept: ORTHOPEDIC SURGERY | Facility: HOSPITAL | Age: 75
End: 2025-06-17
Payer: MEDICARE

## 2025-06-17 VITALS — HEIGHT: 63 IN | BODY MASS INDEX: 30.48 KG/M2 | WEIGHT: 172 LBS

## 2025-06-17 DIAGNOSIS — M65.311 TRIGGER THUMB, RIGHT THUMB: Primary | ICD-10-CM

## 2025-06-17 DIAGNOSIS — M79.644 PAIN OF RIGHT THUMB: ICD-10-CM

## 2025-06-17 DIAGNOSIS — M79.641 HAND PAIN, RIGHT: ICD-10-CM

## 2025-06-17 PROCEDURE — 1159F MED LIST DOCD IN RCRD: CPT | Performed by: ORTHOPAEDIC SURGERY

## 2025-06-17 PROCEDURE — 99213 OFFICE O/P EST LOW 20 MIN: CPT | Performed by: ORTHOPAEDIC SURGERY

## 2025-06-17 PROCEDURE — 73130 X-RAY EXAM OF HAND: CPT | Mod: RT

## 2025-06-17 PROCEDURE — 3008F BODY MASS INDEX DOCD: CPT | Performed by: ORTHOPAEDIC SURGERY

## 2025-06-17 PROCEDURE — 1036F TOBACCO NON-USER: CPT | Performed by: ORTHOPAEDIC SURGERY

## 2025-06-17 PROCEDURE — 73130 X-RAY EXAM OF HAND: CPT | Mod: RIGHT SIDE | Performed by: RADIOLOGY

## 2025-06-17 PROCEDURE — 99203 OFFICE O/P NEW LOW 30 MIN: CPT | Performed by: ORTHOPAEDIC SURGERY

## 2025-06-17 PROCEDURE — 2500000004 HC RX 250 GENERAL PHARMACY W/ HCPCS (ALT 636 FOR OP/ED): Performed by: ORTHOPAEDIC SURGERY

## 2025-06-17 PROCEDURE — 20550 NJX 1 TENDON SHEATH/LIGAMENT: CPT | Mod: RT | Performed by: ORTHOPAEDIC SURGERY

## 2025-06-17 RX ORDER — TRIAMCINOLONE ACETONIDE 40 MG/ML
20 INJECTION, SUSPENSION INTRA-ARTICULAR; INTRAMUSCULAR
Status: COMPLETED | OUTPATIENT
Start: 2025-06-17 | End: 2025-06-17

## 2025-06-17 RX ORDER — LIDOCAINE HYDROCHLORIDE 10 MG/ML
0.5 INJECTION, SOLUTION INFILTRATION; PERINEURAL
Status: COMPLETED | OUTPATIENT
Start: 2025-06-17 | End: 2025-06-17

## 2025-06-17 RX ADMIN — LIDOCAINE HYDROCHLORIDE 0.5 ML: 10 INJECTION, SOLUTION INFILTRATION; PERINEURAL at 08:51

## 2025-06-17 RX ADMIN — TRIAMCINOLONE ACETONIDE 20 MG: 40 INJECTION, SUSPENSION INTRA-ARTICULAR; INTRAMUSCULAR at 08:51

## 2025-06-17 NOTE — LETTER
June 17, 2025     Bina Hinds MD  [address] [facility] [address]    Patient: Montse Manzo   YOB: 1950   Date of Visit: 6/17/2025       Dear Dr. Bina Hinds MD:    Thank you for referring Montse Manzo to me for evaluation. Below are my notes for this consultation.  If you have questions, please do not hesitate to call me. I look forward to following your patient along with you.       Sincerely,     Berny Martinez MD      CC: No Recipients  ______________________________________________________________________________________    [facility]  Hand and Upper Extremity Service  Initial evaluation / Consultation         Consult requested by Referring Physician: Dr. Hinds     Chief Complaint: Right thumb          75 y.o right hand dominant female presenting for symptomatic triggering of the right thumb hat she noticed first after pulling weeds in her yard. She has had no prior treatment for it and no history of diabetes. Her sister had a similar problem that responded to a steroid injection.          Please refer to New Patient Intake Form scanned into patient's electronic record for self reported past medical history, past surgical history, medications, allergies, family history, social history and 10 point review of systems    Examination:  Constitutional: Oriented to person, place, and time.  Appears well-developed and well-nourished.  Head: Normocephalic and atraumatic.  Eyes: Pupils are equal, round, and reactive to light.  Cardiovascular: Intact distal pulses.  Pulmonary/Chest/Breast: Effort normal. No respiratory distress.  Neurological: Alert and oriented to person, place, and time.  Skin: Skin is warm and dry.  Psychiatric: normal mood and affect.  Behavior is normal.  Musculoskeletal: Right hand reveals tenderness to palpation right thumb A1 pulley. Clear evidence of triggering with active IP joint flexion. No tenderness to  palpation over thumb CMC joint.        Personal Interpretation of Diagnostic studies: X-rays of right hand obtained today demonstrate some mild arthritic changes at the base of the right thumb.        Impression:  Right thumb trigger finger       Plan: We discussed treatment options and she wants to try injection. So we gave her a right thumb trigger finger injection today and will follow up as needed for recurrence of symptoms.       In Office Procedures Performed: Right thumb trigger injection   Hand / UE Inj/Asp: R thumb A1 for trigger finger on 6/17/2025 8:51 AM  Indications: tendon swelling and pain  Details: 25 G needle, volar approach  Medications: 0.5 mL lidocaine 10 mg/mL (1 %); 20 mg triamcinolone acetonide 40 mg/mL  Outcome: tolerated well, no immediate complications  Procedure, treatment alternatives, risks and benefits explained, specific risks discussed. Consent was given by the patient. Immediately prior to procedure a time out was called to verify the correct patient, procedure, equipment, support staff and site/side marked as required. Patient was prepped and draped in the usual sterile fashion.       Follow up: As needed              Berny Martinez MD  [facility]  Department of Orthopaedic Surgery  Hand and Upper Extremity Reconstruction      Scribe Attestation  By signing my name below, I, Aime Martin , Scribphil   attest that this documentation has been prepared under the direction and in the presence of Dr. Berny Martinez.      Dictation performed with the use of voice recognition software.  Syntax and grammatical errors may exist. pt's bp is 179/78, , pulse oxygen 92% on 3 Liter oxygen

## 2025-06-20 ENCOUNTER — APPOINTMENT (OUTPATIENT)
Dept: RADIOLOGY | Facility: CLINIC | Age: 75
End: 2025-06-20
Payer: MEDICARE

## 2025-06-20 VITALS — BODY MASS INDEX: 30.48 KG/M2 | WEIGHT: 172 LBS | HEIGHT: 63 IN

## 2025-06-20 DIAGNOSIS — Z12.31 ENCOUNTER FOR SCREENING MAMMOGRAM FOR MALIGNANT NEOPLASM OF BREAST: ICD-10-CM

## 2025-06-20 PROCEDURE — 77067 SCR MAMMO BI INCL CAD: CPT

## 2025-07-15 ENCOUNTER — EVALUATION (OUTPATIENT)
Dept: PHYSICAL THERAPY | Facility: CLINIC | Age: 75
End: 2025-07-15
Payer: MEDICARE

## 2025-07-15 DIAGNOSIS — M25.551 RIGHT HIP PAIN: Primary | ICD-10-CM

## 2025-07-15 DIAGNOSIS — M25.551 PAIN OF RIGHT HIP: ICD-10-CM

## 2025-07-15 DIAGNOSIS — M67.951 TENDINOPATHY OF RIGHT GLUTEUS MEDIUS: ICD-10-CM

## 2025-07-15 PROCEDURE — 97161 PT EVAL LOW COMPLEX 20 MIN: CPT | Mod: GP

## 2025-07-15 PROCEDURE — 97110 THERAPEUTIC EXERCISES: CPT | Mod: GP

## 2025-07-15 ASSESSMENT — ENCOUNTER SYMPTOMS
LOSS OF SENSATION IN FEET: 0
OCCASIONAL FEELINGS OF UNSTEADINESS: 0
DEPRESSION: 0

## 2025-07-15 NOTE — PROGRESS NOTES
Physical Therapy  Physical Therapy Orthopedic Evaluation    Patient Name: Montse Manzo  MRN: [id number]  Today's Date: 7/15/2025    Insurance:  Visit number: 1 of [place]  Authorization info: No  Insurance Type: Payor: [employer] / Plan: [employer] / Product Type: *No Product type* /    Current Problem  Problem List Items Addressed This Visit           ICD-10-CM    Right hip pain - Primary M25.551    Relevant Orders    Follow Up In Physical Therapy    Tendinopathy of right gluteus medius M67.951     Other Visit Diagnoses         Codes      Pain of right hip     M25.551          General:  General  Reason for Referral: R hip pain  Referred By: Hong Orellana MD  Precautions:  Precautions  Precautions Comment: None to PT  Medical History Form: Reviewed (scanned into chart)    Subjective:   WESLEY: Pt reports to evaluation due to R hip pain. 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 Pt will feel her hip when walking and will also feel it more when she is done walking. Pt denies any radicular symptoms down her leg.     Previous Med Management: xrays, ibuprofen    PMH: Nothing related to the hip    Aggravating Factors: Walking longer distances, going down hill or down stairs, external rotation on her R hip     Relieving Factors: Rest     Pain/Symptom Scale:  Current: 0/10  Worst: 4/10  Best: 0/10  Location/Nature: R lateral glute over greater trochanter and describes as achy and tight   Meds: Aleve     PLOF: Prior to a year ago, pt had no increase in hip pain   ADL: No problems   Work: retired teacher   Athletics: Walking and biking   Recreation/ Hobbies: Being outside     Goals: Pt would like to increase her flexibility and decrease her hip pain     Language: English      Red Flags: Do you have any of the following? No  Fever/chills, unexplained weight changes, dizziness/fainting, unexplained change in bowel or bladder functions, unexplained malaise or muscle  weakness, night pain/sweats, numbness or tingling    Objective   Palpation/ Observation   Increased tenderness to palpation over R greater trochanter     Hip ROM   Flexion- R: 100 L: 110  Abduction- R: 30 L:  30  ER- R: 40 (slight increase in pain) L:  40  IR- R: 20 L:  20    Hip MMT  Flexion- R: 4+/5 L:  4+/5  Abduction - R: 4+/5 L: 4+/5  Adduction - R:4+/5 L: 4+/5  Extension - R:4+/5 L: 4+/5    Outcome Measures:  Other Measures  Lower Extremity Funtional Score (LEFS): 63/80     Treatments:  Access Code: [email or web address]  URL: [email or web address]  Date: 07/15/2025  Prepared by: Boyd Summers    Exercises  - Hooklying Isometric Hip Abduction with Belt  - 2-3 x daily - 7 x weekly - 1 sets - 5 reps - 30 sec on/off hold  - Supine Hip Adduction Isometric with Ball  - 1 x daily - 7 x weekly - 3 sets - 10 reps  - Supine Bridge  - 1 x daily - 7 x weekly - 3 sets - 10 reps  - Standing Glute Med Mobilization with Small Ball on Wall  - 1 x daily - 7 x weekly - 3 sets - 10 reps    EDUCATION: Home exercise program, plan of care, activity modifications, pain management, and injury pathology       Assessment:     Patient is a 75 year old female that presents to physical therapy evaluation today due to complaints of R hip pain. Patient impairments include flexibility deficits of R hip rotation, strength deficits in gluteal musculature, and motor control deficits including decreased gluteal activation in weight bearing. Due to these impairments, the patients has the following functional limitations: pain with walking longer distances, difficulty going down steps, and an overall decrease in functional mobility. Skilled therapy recommended in order to to address their impairments and progress towards the associated functional goals. Prognosis is excellent secondary to their current presentation and client understanding. The pt demonstrates a good understanding of their current plan of care, rehab expectations, and  prognosis.          Plan:     Planned Interventions include: therapeutic exercise, self-care home management, manual therapy, therapeutic activities, gait training, neuromuscular coordination, vasopneumatic, dry needling, aquatic therapy  Frequency: 1 x Week  Duration: 6 Weeks  Goals: Set and discussed today  Active       PT Problem       PT Goals       Start:  07/15/25       1. Pt will increase LEFS with 75/80 or better in order to demo an increase in R hip function  2. Pt will demo pain free PROM hip ER in order to demo improved ability to put shoe on   3. Pt will demo 4+/5 or all hip/knee MMT in order to demo an increase in LE strength   4. Pt will be able to ascend and descend 10 steps with reciprocal gait pattern and proper knee control in order to demo and increase in functional mobility   5. Pt will demo compliance and independence with HEP    6. Pt will be able to walk for 30 mins with step through gait pattern and non antalgic gait pattern               Plan of care was developed with input and agreement by the patient  Ambulatory Screenings Summary       Screening  Frequency  Date Last Completed   Spiritual and Cultural Beliefs   Screening  each visit or episode of care 4/17/2025   Falls Risk Screening  every ambulatory visit 7/15/2025  4:50 PM   Pain Screening  annually at primary care visit  5/27/2025   Domestic Violence screening  annually at primary care visit 4/17/2025   Elder Abuse Screening  annually at primary care visit 4/17/2025   Depression Screening  annually in the primary care setting 4/17/2025   Suicide Risk Screening  annually in the primary care setting 4/17/2025   Nutrition and Food Insecurity   Screening  at least annually at primary care visit     Key Learner  annually in the primary care setting 4/17/2025   Drug Screen  8/4/2024  9:19 AM   Alcohol Screen  8/4/2024  9:19 AM   Advance Directive  4/17/2025       Time Calculation  Start Time: 0815  Stop Time: 0900  Time Calculation (min):  45 min  PT Evaluation Time Entry  PT Evaluation (Low) Time Entry: 25 PT Therapeutic Procedures Time Entry  Therapeutic Exercise Time Entry: 15

## 2025-08-07 ENCOUNTER — APPOINTMENT (OUTPATIENT)
Dept: PHYSICAL THERAPY | Facility: CLINIC | Age: 75
End: 2025-08-07
Payer: MEDICARE

## 2025-08-07 DIAGNOSIS — M67.951 TENDINOPATHY OF RIGHT GLUTEUS MEDIUS: ICD-10-CM

## 2025-08-07 DIAGNOSIS — M25.551 RIGHT HIP PAIN: Primary | ICD-10-CM

## 2025-08-12 ENCOUNTER — TREATMENT (OUTPATIENT)
Dept: PHYSICAL THERAPY | Facility: CLINIC | Age: 75
End: 2025-08-12
Payer: MEDICARE

## 2025-08-12 DIAGNOSIS — M25.551 RIGHT HIP PAIN: ICD-10-CM

## 2025-08-12 PROCEDURE — 97110 THERAPEUTIC EXERCISES: CPT | Mod: GP

## 2025-08-12 PROCEDURE — 97140 MANUAL THERAPY 1/> REGIONS: CPT | Mod: GP

## 2025-08-21 ENCOUNTER — TREATMENT (OUTPATIENT)
Dept: PHYSICAL THERAPY | Facility: CLINIC | Age: 75
End: 2025-08-21
Payer: MEDICARE

## 2025-08-21 DIAGNOSIS — M25.551 RIGHT HIP PAIN: ICD-10-CM

## 2025-08-21 PROCEDURE — 97140 MANUAL THERAPY 1/> REGIONS: CPT | Mod: GP

## 2025-08-21 PROCEDURE — 97110 THERAPEUTIC EXERCISES: CPT | Mod: GP

## 2025-08-28 ENCOUNTER — TREATMENT (OUTPATIENT)
Dept: PHYSICAL THERAPY | Facility: CLINIC | Age: 75
End: 2025-08-28
Payer: MEDICARE

## 2025-08-28 DIAGNOSIS — M25.551 RIGHT HIP PAIN: ICD-10-CM

## 2025-08-28 PROCEDURE — 97110 THERAPEUTIC EXERCISES: CPT | Mod: GP

## 2025-08-28 PROCEDURE — 97140 MANUAL THERAPY 1/> REGIONS: CPT | Mod: GP

## 2026-01-28 ENCOUNTER — APPOINTMENT (OUTPATIENT)
Dept: PRIMARY CARE | Facility: CLINIC | Age: 76
End: 2026-01-28
Payer: MEDICARE